# Patient Record
Sex: MALE | Race: WHITE | NOT HISPANIC OR LATINO | Employment: OTHER | URBAN - METROPOLITAN AREA
[De-identification: names, ages, dates, MRNs, and addresses within clinical notes are randomized per-mention and may not be internally consistent; named-entity substitution may affect disease eponyms.]

---

## 2024-04-02 ENCOUNTER — APPOINTMENT (OUTPATIENT)
Dept: GASTROENTEROLOGY | Facility: HOSPITAL | Age: 79
End: 2024-04-02
Payer: MEDICARE

## 2024-04-02 ENCOUNTER — HOSPITAL ENCOUNTER (OUTPATIENT)
Facility: HOSPITAL | Age: 79
Setting detail: OBSERVATION
Discharge: HOME | End: 2024-04-03
Attending: STUDENT IN AN ORGANIZED HEALTH CARE EDUCATION/TRAINING PROGRAM | Admitting: INTERNAL MEDICINE
Payer: MEDICARE

## 2024-04-02 ENCOUNTER — ANESTHESIA (OUTPATIENT)
Dept: GASTROENTEROLOGY | Facility: HOSPITAL | Age: 79
End: 2024-04-02
Payer: MEDICARE

## 2024-04-02 ENCOUNTER — ANESTHESIA EVENT (OUTPATIENT)
Dept: GASTROENTEROLOGY | Facility: HOSPITAL | Age: 79
End: 2024-04-02
Payer: MEDICARE

## 2024-04-02 ENCOUNTER — APPOINTMENT (OUTPATIENT)
Dept: CARDIOLOGY | Facility: HOSPITAL | Age: 79
End: 2024-04-02
Payer: MEDICARE

## 2024-04-02 ENCOUNTER — APPOINTMENT (OUTPATIENT)
Dept: RADIOLOGY | Facility: HOSPITAL | Age: 79
End: 2024-04-02
Payer: MEDICARE

## 2024-04-02 DIAGNOSIS — T18.128A ESOPHAGEAL OBSTRUCTION DUE TO FOOD IMPACTION: Primary | ICD-10-CM
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
## 2024-04-02 DIAGNOSIS — R13.19 ESOPHAGEAL DYSPHAGIA: Primary | ICD-10-CM

## 2024-04-02 PROBLEM — I44.7 LEFT BUNDLE BRANCH BLOCK (LBBB): Status: ACTIVE | Noted: 2023-02-16

## 2024-04-02 PROBLEM — I10 PRIMARY HYPERTENSION: Status: ACTIVE | Noted: 2023-01-20

## 2024-04-02 PROBLEM — I35.8 AORTIC VALVE SCLEROSIS: Status: ACTIVE | Noted: 2023-02-16

## 2024-04-02 PROBLEM — I63.533: Status: ACTIVE | Noted: 2023-01-20

## 2024-04-02 PROBLEM — I25.810 CORONARY ARTERY DISEASE INVOLVING CORONARY BYPASS GRAFT OF NATIVE HEART WITHOUT ANGINA PECTORIS: Status: ACTIVE | Noted: 2023-01-20

## 2024-04-02 PROBLEM — R41.841 COGNITIVE COMMUNICATION DEFICIT: Status: ACTIVE | Noted: 2023-01-30

## 2024-04-02 PROBLEM — R13.10 DYSPHAGIA: Status: ACTIVE | Noted: 2024-04-02

## 2024-04-02 PROBLEM — I25.10 CAD (CORONARY ARTERY DISEASE), NATIVE CORONARY ARTERY: Status: ACTIVE | Noted: 2023-02-16

## 2024-04-02 LAB
ALBUMIN SERPL BCP-MCNC: 3.9 G/DL (ref 3.4–5)
ALP SERPL-CCNC: 63 U/L (ref 33–136)
ALT SERPL W P-5'-P-CCNC: 20 U/L (ref 10–52)
ANION GAP SERPL CALC-SCNC: 11 MMOL/L (ref 10–20)
AST SERPL W P-5'-P-CCNC: 21 U/L (ref 9–39)
BASOPHILS # BLD AUTO: 0.06 X10*3/UL (ref 0–0.1)
BASOPHILS NFR BLD AUTO: 0.8 %
BILIRUB SERPL-MCNC: 0.3 MG/DL (ref 0–1.2)
BUN SERPL-MCNC: 16 MG/DL (ref 6–23)
CALCIUM SERPL-MCNC: 8.9 MG/DL (ref 8.6–10.3)
CARDIAC TROPONIN I PNL SERPL HS: 8 NG/L (ref 0–20)
CHLORIDE SERPL-SCNC: 102 MMOL/L (ref 98–107)
CO2 SERPL-SCNC: 28 MMOL/L (ref 21–32)
CREAT SERPL-MCNC: 0.84 MG/DL (ref 0.5–1.3)
EGFRCR SERPLBLD CKD-EPI 2021: 88 ML/MIN/1.73M*2
EOSINOPHIL # BLD AUTO: 0.49 X10*3/UL (ref 0–0.4)
EOSINOPHIL NFR BLD AUTO: 6.7 %
ERYTHROCYTE [DISTWIDTH] IN BLOOD BY AUTOMATED COUNT: 13.2 % (ref 11.5–14.5)
GLUCOSE SERPL-MCNC: 91 MG/DL (ref 74–99)
HCT VFR BLD AUTO: 40.3 % (ref 41–52)
HGB BLD-MCNC: 13.9 G/DL (ref 13.5–17.5)
IMM GRANULOCYTES # BLD AUTO: 0.03 X10*3/UL (ref 0–0.5)
IMM GRANULOCYTES NFR BLD AUTO: 0.4 % (ref 0–0.9)
LYMPHOCYTES # BLD AUTO: 2.23 X10*3/UL (ref 0.8–3)
LYMPHOCYTES NFR BLD AUTO: 30.5 %
MCH RBC QN AUTO: 33.2 PG (ref 26–34)
MCHC RBC AUTO-ENTMCNC: 34.5 G/DL (ref 32–36)
MCV RBC AUTO: 96 FL (ref 80–100)
MONOCYTES # BLD AUTO: 0.67 X10*3/UL (ref 0.05–0.8)
MONOCYTES NFR BLD AUTO: 9.2 %
NEUTROPHILS # BLD AUTO: 3.84 X10*3/UL (ref 1.6–5.5)
NEUTROPHILS NFR BLD AUTO: 52.4 %
NRBC BLD-RTO: 0 /100 WBCS (ref 0–0)
PLATELET # BLD AUTO: 241 X10*3/UL (ref 150–450)
POTASSIUM SERPL-SCNC: 3.8 MMOL/L (ref 3.5–5.3)
PROT SERPL-MCNC: 6.4 G/DL (ref 6.4–8.2)
RBC # BLD AUTO: 4.19 X10*6/UL (ref 4.5–5.9)
SODIUM SERPL-SCNC: 137 MMOL/L (ref 136–145)
WBC # BLD AUTO: 7.3 X10*3/UL (ref 4.4–11.3)

## 2024-04-02 PROCEDURE — 43239 EGD BIOPSY SINGLE/MULTIPLE: CPT | Performed by: STUDENT IN AN ORGANIZED HEALTH CARE EDUCATION/TRAINING PROGRAM

## 2024-04-02 PROCEDURE — 2720000007 HC OR 272 NO HCPCS

## 2024-04-02 PROCEDURE — 99100 ANES PT EXTEME AGE<1 YR&>70: CPT | Performed by: ANESTHESIOLOGY

## 2024-04-02 PROCEDURE — 99140 ANES COMP EMERGENCY COND: CPT | Performed by: ANESTHESIOLOGY

## 2024-04-02 PROCEDURE — G0378 HOSPITAL OBSERVATION PER HR: HCPCS

## 2024-04-02 PROCEDURE — 2500000004 HC RX 250 GENERAL PHARMACY W/ HCPCS (ALT 636 FOR OP/ED): Mod: JZ | Performed by: PHYSICIAN ASSISTANT

## 2024-04-02 PROCEDURE — 80053 COMPREHEN METABOLIC PANEL: CPT | Performed by: PHYSICIAN ASSISTANT

## 2024-04-02 PROCEDURE — A43239 PR EDG TRANSORAL BIOPSY SINGLE/MULTIPLE: Performed by: NURSE ANESTHETIST, CERTIFIED REGISTERED

## 2024-04-02 PROCEDURE — 71046 X-RAY EXAM CHEST 2 VIEWS: CPT

## 2024-04-02 PROCEDURE — 88305 TISSUE EXAM BY PATHOLOGIST: CPT | Performed by: PATHOLOGY

## 2024-04-02 PROCEDURE — A43239 PR EDG TRANSORAL BIOPSY SINGLE/MULTIPLE: Performed by: ANESTHESIOLOGY

## 2024-04-02 PROCEDURE — 84484 ASSAY OF TROPONIN QUANT: CPT | Performed by: PHYSICIAN ASSISTANT

## 2024-04-02 PROCEDURE — 36415 COLL VENOUS BLD VENIPUNCTURE: CPT | Performed by: PHYSICIAN ASSISTANT

## 2024-04-02 PROCEDURE — 2500000004 HC RX 250 GENERAL PHARMACY W/ HCPCS (ALT 636 FOR OP/ED): Performed by: NURSE ANESTHETIST, CERTIFIED REGISTERED

## 2024-04-02 PROCEDURE — 3700000001 HC GENERAL ANESTHESIA TIME - INITIAL BASE CHARGE

## 2024-04-02 PROCEDURE — 3700000002 HC GENERAL ANESTHESIA TIME - EACH INCREMENTAL 1 MINUTE

## 2024-04-02 PROCEDURE — 93005 ELECTROCARDIOGRAM TRACING: CPT

## 2024-04-02 PROCEDURE — 7100000002 HC RECOVERY ROOM TIME - EACH INCREMENTAL 1 MINUTE

## 2024-04-02 PROCEDURE — 99223 1ST HOSP IP/OBS HIGH 75: CPT | Performed by: STUDENT IN AN ORGANIZED HEALTH CARE EDUCATION/TRAINING PROGRAM

## 2024-04-02 PROCEDURE — 99285 EMERGENCY DEPT VISIT HI MDM: CPT | Mod: 25

## 2024-04-02 PROCEDURE — 2500000005 HC RX 250 GENERAL PHARMACY W/O HCPCS: Performed by: NURSE ANESTHETIST, CERTIFIED REGISTERED

## 2024-04-02 PROCEDURE — 43239 EGD BIOPSY SINGLE/MULTIPLE: CPT

## 2024-04-02 PROCEDURE — 85025 COMPLETE CBC W/AUTO DIFF WBC: CPT | Performed by: PHYSICIAN ASSISTANT

## 2024-04-02 PROCEDURE — 99222 1ST HOSP IP/OBS MODERATE 55: CPT | Performed by: INTERNAL MEDICINE

## 2024-04-02 PROCEDURE — 71046 X-RAY EXAM CHEST 2 VIEWS: CPT | Performed by: RADIOLOGY

## 2024-04-02 PROCEDURE — 7100000001 HC RECOVERY ROOM TIME - INITIAL BASE CHARGE

## 2024-04-02 PROCEDURE — 0753T DGTZ GLS MCRSCP SLD LEVEL IV: CPT | Mod: TC | Performed by: STUDENT IN AN ORGANIZED HEALTH CARE EDUCATION/TRAINING PROGRAM

## 2024-04-02 RX ORDER — PROPOFOL 10 MG/ML
INJECTION, EMULSION INTRAVENOUS AS NEEDED
Status: DISCONTINUED | OUTPATIENT
Start: 2024-04-02 | End: 2024-04-02

## 2024-04-02 RX ORDER — ROCURONIUM BROMIDE 10 MG/ML
INJECTION, SOLUTION INTRAVENOUS AS NEEDED
Status: DISCONTINUED | OUTPATIENT
Start: 2024-04-02 | End: 2024-04-02

## 2024-04-02 RX ORDER — PANTOPRAZOLE SODIUM 40 MG/10ML
40 INJECTION, POWDER, LYOPHILIZED, FOR SOLUTION INTRAVENOUS DAILY
Status: DISCONTINUED | OUTPATIENT
Start: 2024-04-02 | End: 2024-04-03 | Stop reason: HOSPADM

## 2024-04-02 RX ORDER — SODIUM CHLORIDE, SODIUM LACTATE, POTASSIUM CHLORIDE, CALCIUM CHLORIDE 600; 310; 30; 20 MG/100ML; MG/100ML; MG/100ML; MG/100ML
125 INJECTION, SOLUTION INTRAVENOUS CONTINUOUS
Status: DISCONTINUED | OUTPATIENT
Start: 2024-04-02 | End: 2024-04-03

## 2024-04-02 RX ORDER — IPRATROPIUM BROMIDE 0.5 MG/2.5ML
500 SOLUTION RESPIRATORY (INHALATION) ONCE
Status: DISCONTINUED | OUTPATIENT
Start: 2024-04-02 | End: 2024-04-03 | Stop reason: HOSPADM

## 2024-04-02 RX ORDER — OMEPRAZOLE 20 MG/1
20 TABLET, DELAYED RELEASE ORAL
Qty: 90 TABLET | Refills: 3 | Status: SHIPPED | OUTPATIENT
Start: 2024-04-02

## 2024-04-02 RX ORDER — ALBUTEROL SULFATE 0.83 MG/ML
2.5 SOLUTION RESPIRATORY (INHALATION) ONCE AS NEEDED
Status: DISCONTINUED | OUTPATIENT
Start: 2024-04-02 | End: 2024-04-03 | Stop reason: HOSPADM

## 2024-04-02 RX ORDER — ONDANSETRON HYDROCHLORIDE 2 MG/ML
4 INJECTION, SOLUTION INTRAVENOUS ONCE AS NEEDED
Status: DISCONTINUED | OUTPATIENT
Start: 2024-04-02 | End: 2024-04-03 | Stop reason: HOSPADM

## 2024-04-02 RX ORDER — LIDOCAINE HYDROCHLORIDE 10 MG/ML
0.1 INJECTION INFILTRATION; PERINEURAL ONCE
Status: DISCONTINUED | OUTPATIENT
Start: 2024-04-02 | End: 2024-04-03 | Stop reason: HOSPADM

## 2024-04-02 RX ORDER — SUCCINYLCHOLINE CHLORIDE 20 MG/ML
INJECTION INTRAMUSCULAR; INTRAVENOUS AS NEEDED
Status: DISCONTINUED | OUTPATIENT
Start: 2024-04-02 | End: 2024-04-02

## 2024-04-02 RX ORDER — SODIUM CHLORIDE, SODIUM LACTATE, POTASSIUM CHLORIDE, CALCIUM CHLORIDE 600; 310; 30; 20 MG/100ML; MG/100ML; MG/100ML; MG/100ML
100 INJECTION, SOLUTION INTRAVENOUS CONTINUOUS
Status: DISCONTINUED | OUTPATIENT
Start: 2024-04-02 | End: 2024-04-03

## 2024-04-02 RX ORDER — HYDRALAZINE HYDROCHLORIDE 20 MG/ML
10 INJECTION INTRAMUSCULAR; INTRAVENOUS EVERY 6 HOURS PRN
Status: DISCONTINUED | OUTPATIENT
Start: 2024-04-02 | End: 2024-04-03 | Stop reason: HOSPADM

## 2024-04-02 RX ORDER — ACETAMINOPHEN 325 MG/1
650 TABLET ORAL EVERY 4 HOURS PRN
Status: DISCONTINUED | OUTPATIENT
Start: 2024-04-02 | End: 2024-04-03 | Stop reason: HOSPADM

## 2024-04-02 RX ADMIN — ROCURONIUM BROMIDE 10 MG: 10 INJECTION, SOLUTION INTRAVENOUS at 22:11

## 2024-04-02 RX ADMIN — PROPOFOL 120 MG: 10 INJECTION, EMULSION INTRAVENOUS at 22:11

## 2024-04-02 RX ADMIN — SODIUM CHLORIDE, POTASSIUM CHLORIDE, SODIUM LACTATE AND CALCIUM CHLORIDE: 600; 310; 30; 20 INJECTION, SOLUTION INTRAVENOUS at 22:07

## 2024-04-02 RX ADMIN — Medication 1 MG: at 20:58

## 2024-04-02 RX ADMIN — DEXAMETHASONE SODIUM PHOSPHATE 8 MG: 4 INJECTION, SOLUTION INTRAMUSCULAR; INTRAVENOUS at 22:30

## 2024-04-02 RX ADMIN — SUCCINYLCHOLINE CHLORIDE 100 MG: 20 INJECTION, SOLUTION INTRAMUSCULAR; INTRAVENOUS at 22:11

## 2024-04-02 SDOH — HEALTH STABILITY: MENTAL HEALTH: CURRENT SMOKER: 0

## 2024-04-02 ASSESSMENT — ENCOUNTER SYMPTOMS
SHORTNESS OF BREATH: 0
DIZZINESS: 0
VOMITING: 0
WOUND: 0
RHINORRHEA: 0
DIARRHEA: 0
HEMATURIA: 0
PALPITATIONS: 0
MYALGIAS: 0
CONFUSION: 0
FEVER: 0
SORE THROAT: 0
COUGH: 0
HALLUCINATIONS: 0
CONSTIPATION: 0
CHILLS: 0
DYSURIA: 0
NAUSEA: 0
ARTHRALGIAS: 0

## 2024-04-02 ASSESSMENT — PAIN - FUNCTIONAL ASSESSMENT
PAIN_FUNCTIONAL_ASSESSMENT: 0-10

## 2024-04-02 ASSESSMENT — PAIN SCALES - GENERAL
PAINLEVEL_OUTOF10: 0 - NO PAIN

## 2024-04-02 ASSESSMENT — COLUMBIA-SUICIDE SEVERITY RATING SCALE - C-SSRS
6. HAVE YOU EVER DONE ANYTHING, STARTED TO DO ANYTHING, OR PREPARED TO DO ANYTHING TO END YOUR LIFE?: NO
2. HAVE YOU ACTUALLY HAD ANY THOUGHTS OF KILLING YOURSELF?: NO
1. IN THE PAST MONTH, HAVE YOU WISHED YOU WERE DEAD OR WISHED YOU COULD GO TO SLEEP AND NOT WAKE UP?: NO

## 2024-04-02 ASSESSMENT — PAIN DESCRIPTION - DESCRIPTORS: DESCRIPTORS: PRESSURE

## 2024-04-02 NOTE — ED PROVIDER NOTES
"Limitations to History: None  External Records Reviewed  Independent Historians: None  Social determinants affecting care: None    HPI  Henry Alexandre is a 79 y.o. male presents emergency department with his wife for assessment of foreign body sensation in the esophagus.  He reports that he was eating spaghetti when he developed a discomfort in the midsternum of his chest.  He reports that he started spitting and felt very nauseated.  He reports that he did try to drink some pop but this just caused him to spit.  He reports that it feels like a pressure in the middle of his chest.  He has had food bolus obstruction in the past when she had to have EGD.  He has not had fever or chills.  He denies cough or congestion.  He denies abdominal pains.  The patient and his wife have no further complaints.    [facility]  Past Medical History:   Diagnosis Date    Personal history of other endocrine, nutritional and metabolic disease 01/03/2023    History of hypothyroidism    reviewed by myself.    Meds  No current outpatient medications    Allergies  No Known Allergies reviewed by myself.    SHx    reviewed by myself.      ------------------------------------------------------------------------------------------------------------------------------------------    /76   Pulse 55   Temp 36.5 °C (97.7 °F)   Resp (!) 22   Ht 1.778 m (5' 10\")   Wt 82.6 kg (182 lb)   SpO2 94%   BMI 26.11 kg/m²     Physical Exam  Vitals and nursing note reviewed.   Constitutional:       General: He is not in acute distress.     Appearance: Normal appearance. He is normal weight. He is not ill-appearing or toxic-appearing.   HENT:      Head: Normocephalic.      Nose: Nose normal.      Mouth/Throat:      Mouth: Mucous membranes are moist.   Eyes:      Extraocular Movements: Extraocular movements intact.      Conjunctiva/sclera: Conjunctivae normal.   Cardiovascular:      Rate and Rhythm: Regular rhythm. Bradycardia present.   Pulmonary:      Effort: " Pulmonary effort is normal.      Breath sounds: Normal breath sounds.   Abdominal:      General: Abdomen is flat. Bowel sounds are normal.      Palpations: Abdomen is soft.      Tenderness: There is no abdominal tenderness.   Musculoskeletal:         General: Normal range of motion.      Cervical back: Neck supple.   Skin:     General: Skin is warm and dry.   Neurological:      Mental Status: He is alert and oriented to person, place, and time.   Psychiatric:         Attention and Perception: Attention normal.         Mood and Affect: Mood normal.          ------------------------------------------------------------------------------------------------------------------------------------------  Labs  Labs Reviewed   CBC WITH AUTO DIFFERENTIAL - Abnormal       Result Value    WBC 7.3      nRBC 0.0      RBC 4.19 (*)     Hemoglobin 13.9      Hematocrit 40.3 (*)     MCV 96      MCH 33.2      MCHC 34.5      RDW 13.2      Platelets 241      Neutrophils % 52.4      Immature Granulocytes %, Automated 0.4      Lymphocytes % 30.5      Monocytes % 9.2      Eosinophils % 6.7      Basophils % 0.8      Neutrophils Absolute 3.84      Immature Granulocytes Absolute, Automated 0.03      Lymphocytes Absolute 2.23      Monocytes Absolute 0.67      Eosinophils Absolute 0.49 (*)     Basophils Absolute 0.06     COMPREHENSIVE METABOLIC PANEL - Normal    Glucose 91      Sodium 137      Potassium 3.8      Chloride 102      Bicarbonate 28      Anion Gap 11      Urea Nitrogen 16      Creatinine 0.84      eGFR 88      Calcium 8.9      Albumin 3.9      Alkaline Phosphatase 63      Total Protein 6.4      AST 21      Bilirubin, Total 0.3      ALT 20     TROPONIN I, HIGH SENSITIVITY - Normal    Troponin I, High Sensitivity 8      Narrative:     Less than 99th percentile of normal range cutoff-  Female and children under 18 years old <14 ng/L; Male <21 ng/L: Negative  Repeat testing should be performed if clinically indicated.     Female and  children under 18 years old 14-50 ng/L; Male 21-50 ng/L:  Consistent with possible cardiac damage and possible increased clinical   risk. Serial measurements may help to assess extent of myocardial damage.     >50 ng/L: Consistent with cardiac damage, increased clinical risk and  myocardial infarction. Serial measurements may help assess extent of   myocardial damage.      NOTE: Children less than 1 year old may have higher baseline troponin   levels and results should be interpreted in conjunction with the overall   clinical context.     NOTE: Troponin I testing is performed using a different   testing methodology at [facility] than at other   [facility] hospitals. Direct result comparisons should only   be made within the same method.        Imaging  XR chest 2 views   Final Result   1. Slight interval worsening of left basilar atelectasis/scarring or   infiltrate. Clinical correlation and follow-up recommended to   document resolution. No pneumothorax. If there is clinical concern   for acute aortic pathology or pulmonary embolic disease, CT scan of   the chest should be considered for better assessment.             Signed by: Marie Brown 4/2/2024 7:40 PM   Dictation workstation:   [id number]           ED Course  Diagnoses as of 04/02/24 2117   Esophageal obstruction due to food impaction        Medical Decision Making: He did not appear ill or toxic.  Vital signs reviewed and stable.  He could have a partial food bolus obstruction however because he is describing midsternal chest pressure with his age, I did order cardiac workup.  Nursing staff instructed to give him oral fluids.    Differential diagnoses considered: Food bolus obstruction, ACS, GERD, acid reflux, others    Medications given: IV glucagon    EKG interpreted by myself and ED attending: Sinus bradycardia.  Ventricular rate 45 bpm.  Inverted T waves in V1 through V3.  No acute ST elevations or depressions.  Similar appearance from  previous EKG on 5/26/2023.    I reviewed the labs from today.  No leukocytosis or leukopenia.  H&H stable.  No electrolyte abnormalities.  Troponin negative.  Chest x-ray showing slight interval worsening of the left basilar atelectasis/scarring versus infiltrate.  Patient was given oral fluids.  He is reassessed.  He is spitting multiple times after this.  He has not had any vomiting but he still has a foreign body sensation.  Patient ordered IV glucagon.  This did not improve his symptoms.  He still is very uncomfortable.  I consulted gastroenterology due to the concern for food bolus obstruction.  I spoke with Dr. Son will come in for EGD.  This was discussed with the patient who is agreeable.  Case discussed and evaluated with ED attending, Dr. Kraft who is agreeable to patient plan of care.    Diagnosis: Food bolus obstruction  Plan: EGD     Rafal Fox PA-C  04/02/24 1164

## 2024-04-03 VITALS
DIASTOLIC BLOOD PRESSURE: 77 MMHG | OXYGEN SATURATION: 93 % | HEART RATE: 57 BPM | TEMPERATURE: 98.2 F | BODY MASS INDEX: 26.05 KG/M2 | WEIGHT: 182 LBS | RESPIRATION RATE: 16 BRPM | SYSTOLIC BLOOD PRESSURE: 166 MMHG | HEIGHT: 70 IN

## 2024-04-03 PROCEDURE — 99239 HOSP IP/OBS DSCHRG MGMT >30: CPT | Performed by: STUDENT IN AN ORGANIZED HEALTH CARE EDUCATION/TRAINING PROGRAM

## 2024-04-03 PROCEDURE — 97161 PT EVAL LOW COMPLEX 20 MIN: CPT | Mod: GP

## 2024-04-03 PROCEDURE — 97165 OT EVAL LOW COMPLEX 30 MIN: CPT | Mod: GO

## 2024-04-03 PROCEDURE — G0378 HOSPITAL OBSERVATION PER HR: HCPCS

## 2024-04-03 PROCEDURE — 2500000004 HC RX 250 GENERAL PHARMACY W/ HCPCS (ALT 636 FOR OP/ED): Performed by: INTERNAL MEDICINE

## 2024-04-03 PROCEDURE — 96372 THER/PROPH/DIAG INJ SC/IM: CPT | Performed by: INTERNAL MEDICINE

## 2024-04-03 RX ORDER — IBUPROFEN 100 MG/5ML
1000 SUSPENSION, ORAL (FINAL DOSE FORM) ORAL DAILY
COMMUNITY

## 2024-04-03 RX ORDER — RAMIPRIL 10 MG/1
10 CAPSULE ORAL DAILY
COMMUNITY

## 2024-04-03 RX ORDER — TALC
3 POWDER (GRAM) TOPICAL NIGHTLY PRN
Status: DISCONTINUED | OUTPATIENT
Start: 2024-04-03 | End: 2024-04-03 | Stop reason: HOSPADM

## 2024-04-03 RX ORDER — LEVOTHYROXINE SODIUM 75 UG/1
75 TABLET ORAL
COMMUNITY

## 2024-04-03 RX ORDER — HEPARIN SODIUM 5000 [USP'U]/ML
5000 INJECTION, SOLUTION INTRAVENOUS; SUBCUTANEOUS EVERY 8 HOURS
Status: DISCONTINUED | OUTPATIENT
Start: 2024-04-03 | End: 2024-04-03 | Stop reason: HOSPADM

## 2024-04-03 RX ORDER — TRIAMTERENE AND HYDROCHLOROTHIAZIDE 37.5; 25 MG/1; MG/1
1 CAPSULE ORAL EVERY MORNING
COMMUNITY

## 2024-04-03 RX ORDER — AMLODIPINE BESYLATE 10 MG/1
10 TABLET ORAL NIGHTLY
COMMUNITY

## 2024-04-03 RX ORDER — METOPROLOL TARTRATE 25 MG/1
25 TABLET, FILM COATED ORAL DAILY
COMMUNITY

## 2024-04-03 RX ORDER — ACETAMINOPHEN 160 MG/5ML
650 SOLUTION ORAL EVERY 4 HOURS PRN
Status: DISCONTINUED | OUTPATIENT
Start: 2024-04-03 | End: 2024-04-03 | Stop reason: HOSPADM

## 2024-04-03 RX ORDER — ATORVASTATIN CALCIUM 80 MG/1
80 TABLET, FILM COATED ORAL NIGHTLY
COMMUNITY

## 2024-04-03 RX ORDER — ACETAMINOPHEN 650 MG/1
650 SUPPOSITORY RECTAL EVERY 4 HOURS PRN
Status: DISCONTINUED | OUTPATIENT
Start: 2024-04-03 | End: 2024-04-03 | Stop reason: HOSPADM

## 2024-04-03 RX ORDER — TAMSULOSIN HYDROCHLORIDE 0.4 MG/1
0.4 CAPSULE ORAL NIGHTLY
COMMUNITY

## 2024-04-03 RX ORDER — POLYETHYLENE GLYCOL 3350 17 G/17G
17 POWDER, FOR SOLUTION ORAL DAILY PRN
Status: DISCONTINUED | OUTPATIENT
Start: 2024-04-03 | End: 2024-04-03 | Stop reason: HOSPADM

## 2024-04-03 RX ORDER — ACETAMINOPHEN 325 MG/1
650 TABLET ORAL EVERY 4 HOURS PRN
Status: DISCONTINUED | OUTPATIENT
Start: 2024-04-03 | End: 2024-04-03 | Stop reason: HOSPADM

## 2024-04-03 RX ORDER — CETIRIZINE HYDROCHLORIDE 10 MG/1
10 TABLET, CHEWABLE ORAL DAILY
COMMUNITY

## 2024-04-03 RX ORDER — ASPIRIN 81 MG/1
81 TABLET ORAL DAILY
COMMUNITY

## 2024-04-03 RX ADMIN — SODIUM CHLORIDE, POTASSIUM CHLORIDE, SODIUM LACTATE AND CALCIUM CHLORIDE 125 ML/HR: 600; 310; 30; 20 INJECTION, SOLUTION INTRAVENOUS at 03:23

## 2024-04-03 RX ADMIN — HEPARIN SODIUM 5000 UNITS: 5000 INJECTION INTRAVENOUS; SUBCUTANEOUS at 08:17

## 2024-04-03 SDOH — SOCIAL STABILITY: SOCIAL INSECURITY: ARE YOU OR HAVE YOU BEEN THREATENED OR ABUSED PHYSICALLY, EMOTIONALLY, OR SEXUALLY BY ANYONE?: NO

## 2024-04-03 SDOH — SOCIAL STABILITY: SOCIAL INSECURITY: HAVE YOU HAD THOUGHTS OF HARMING ANYONE ELSE?: NO

## 2024-04-03 SDOH — SOCIAL STABILITY: SOCIAL INSECURITY: ARE THERE ANY APPARENT SIGNS OF INJURIES/BEHAVIORS THAT COULD BE RELATED TO ABUSE/NEGLECT?: NO

## 2024-04-03 SDOH — SOCIAL STABILITY: SOCIAL INSECURITY: ABUSE: ADULT

## 2024-04-03 SDOH — SOCIAL STABILITY: SOCIAL INSECURITY: DO YOU FEEL ANYONE HAS EXPLOITED OR TAKEN ADVANTAGE OF YOU FINANCIALLY OR OF YOUR PERSONAL PROPERTY?: NO

## 2024-04-03 SDOH — SOCIAL STABILITY: SOCIAL INSECURITY: DO YOU FEEL UNSAFE GOING BACK TO THE PLACE WHERE YOU ARE LIVING?: NO

## 2024-04-03 SDOH — SOCIAL STABILITY: SOCIAL INSECURITY: DOES ANYONE TRY TO KEEP YOU FROM HAVING/CONTACTING OTHER FRIENDS OR DOING THINGS OUTSIDE YOUR HOME?: NO

## 2024-04-03 SDOH — SOCIAL STABILITY: SOCIAL INSECURITY: HAS ANYONE EVER THREATENED TO HURT YOUR FAMILY OR YOUR PETS?: NO

## 2024-04-03 ASSESSMENT — COGNITIVE AND FUNCTIONAL STATUS - GENERAL
TURNING FROM BACK TO SIDE WHILE IN FLAT BAD: A LITTLE
WALKING IN HOSPITAL ROOM: A LITTLE
MOBILITY SCORE: 22
WALKING IN HOSPITAL ROOM: A LITTLE
MOVING TO AND FROM BED TO CHAIR: A LITTLE
TOILETING: A LITTLE
MOBILITY SCORE: 19
CLIMB 3 TO 5 STEPS WITH RAILING: A LITTLE
HELP NEEDED FOR BATHING: A LITTLE
MOVING FROM LYING ON BACK TO SITTING ON SIDE OF FLAT BED WITH BEDRAILS: A LITTLE
DAILY ACTIVITIY SCORE: 22
PATIENT BASELINE BEDBOUND: NO
DAILY ACTIVITIY SCORE: 24
CLIMB 3 TO 5 STEPS WITH RAILING: A LITTLE

## 2024-04-03 ASSESSMENT — ACTIVITIES OF DAILY LIVING (ADL)
TOILETING: INDEPENDENT
ADEQUATE_TO_COMPLETE_ADL: YES
WALKS IN HOME: INDEPENDENT
LACK_OF_TRANSPORTATION: NO
HEARING - LEFT EAR: HEARING AID
GROOMING: INDEPENDENT
ADL_ASSISTANCE: INDEPENDENT
HEARING - RIGHT EAR: HEARING AID
FEEDING YOURSELF: INDEPENDENT
DRESSING YOURSELF: INDEPENDENT
JUDGMENT_ADEQUATE_SAFELY_COMPLETE_DAILY_ACTIVITIES: YES
BATHING: INDEPENDENT
PATIENT'S MEMORY ADEQUATE TO SAFELY COMPLETE DAILY ACTIVITIES?: YES
BATHING_ASSISTANCE: STAND BY

## 2024-04-03 ASSESSMENT — PATIENT HEALTH QUESTIONNAIRE - PHQ9
2. FEELING DOWN, DEPRESSED OR HOPELESS: NOT AT ALL
1. LITTLE INTEREST OR PLEASURE IN DOING THINGS: NOT AT ALL
SUM OF ALL RESPONSES TO PHQ9 QUESTIONS 1 & 2: 0

## 2024-04-03 ASSESSMENT — LIFESTYLE VARIABLES
HOW OFTEN DO YOU HAVE 6 OR MORE DRINKS ON ONE OCCASION: NEVER
PRESCIPTION_ABUSE_PAST_12_MONTHS: NO
HOW MANY STANDARD DRINKS CONTAINING ALCOHOL DO YOU HAVE ON A TYPICAL DAY: PATIENT DOES NOT DRINK
SUBSTANCE_ABUSE_PAST_12_MONTHS: NO
HOW OFTEN DO YOU HAVE A DRINK CONTAINING ALCOHOL: NEVER
SKIP TO QUESTIONS 9-10: 1
AUDIT-C TOTAL SCORE: 0
AUDIT-C TOTAL SCORE: 0

## 2024-04-03 ASSESSMENT — PAIN - FUNCTIONAL ASSESSMENT
PAIN_FUNCTIONAL_ASSESSMENT: 0-10
PAIN_FUNCTIONAL_ASSESSMENT: 0-10

## 2024-04-03 ASSESSMENT — PAIN SCALES - GENERAL
PAINLEVEL_OUTOF10: 0 - NO PAIN
PAINLEVEL_OUTOF10: 0 - NO PAIN

## 2024-04-03 NOTE — H&P
History Of Present Illness  Henry Alexandre is a 79 y.o. male  with PMH CAD, stroke and dysphagia presented with concern of having spaghetti impacted in throat with dinner.  Presented to ED and taken directly to EGD. Patient had food bolus in lower 1/3 of esophagus and was able to be pushed into stomach.  Patient recommended repeat EGD in 2 months and daily PPI.  Concern for aspiration during procedure so observation was requested.      Past Medical History  Past Medical History:   Diagnosis Date    Personal history of other endocrine, nutritional and metabolic disease 01/03/2023    History of hypothyroidism       Surgical History  Past Surgical History:   Procedure Laterality Date    CT ANGIO NECK  1/8/2023    CT NECK ANGIO W AND WO IV CONTRAST 1/8/2023 DOCTOR OFFICE [facility]    CT HEAD ANGIO W AND WO IV CONTRAST  1/8/2023    CT HEAD ANGIO W AND WO IV CONTRAST 1/8/2023 DOCTOR OFFICE LEGACY    OTHER SURGICAL HISTORY  01/03/2023    Knee replacement    OTHER SURGICAL HISTORY  01/03/2023    Heart surgery    OTHER SURGICAL HISTORY  01/03/2023    Cataract surgery        Social History  He has no history on file for tobacco use, alcohol use, and drug use.    Family History  No family history on file.     Allergies  Patient has no known allergies.    Review of Systems   Reason unable to perform ROS: limited ROS as patient sleepy.   Constitutional:  Negative for chills and fever.   HENT:  Negative for rhinorrhea and sore throat.    Respiratory:  Negative for cough and shortness of breath.    Cardiovascular:  Negative for chest pain and palpitations.   Gastrointestinal:  Negative for constipation, diarrhea, nausea and vomiting.   Genitourinary:  Negative for dysuria and hematuria.   Musculoskeletal:  Negative for arthralgias and myalgias.   Skin:  Negative for rash and wound.   Neurological:  Negative for dizziness and syncope.   Psychiatric/Behavioral:  Negative for confusion and hallucinations.         Physical Exam  Vitals  "reviewed.   Constitutional:       Appearance: Normal appearance. He is normal weight. He is not ill-appearing.   HENT:      Head: Normocephalic and atraumatic.      Mouth/Throat:      Mouth: Mucous membranes are moist.   Eyes:      Conjunctiva/sclera: Conjunctivae normal.   Cardiovascular:      Rate and Rhythm: Normal rate.      Pulses: Normal pulses.   Pulmonary:      Effort: Pulmonary effort is normal.      Breath sounds: Normal breath sounds. No wheezing.   Abdominal:      General: Abdomen is flat. There is no distension.      Palpations: Abdomen is soft.      Tenderness: There is no guarding.   Musculoskeletal:         General: No swelling. Normal range of motion.   Skin:     General: Skin is warm and dry.   Neurological:      General: No focal deficit present.      Mental Status: Mental status is at baseline.   Psychiatric:         Mood and Affect: Mood normal.         Behavior: Behavior normal.          Last Recorded Vitals  Blood pressure 157/86, pulse 51, temperature 36 °C (96.8 °F), temperature source Temporal, resp. rate 16, height 1.778 m (5' 10\"), weight 82.6 kg (182 lb), SpO2 96 %.    Relevant Results             Assessment/Plan   Active Problems:    CAD (coronary artery disease), native coronary artery    Cerebrovascular accident (CVA) due to bilateral stenosis of posterior cerebral arteries (CMS/HCC)    Cognitive communication deficit    Dysphagia    Primary hypertension    Food impaction  Hx CAD, stroke, dysphagia    Observe overnight per GI recs  NPO tonight  Liquid diet to start in am  No oral meds tonight.  IV hydralazine if needed for BP control tonight  Home meds in am once reconcilled  IV PPI tonight, then oral daily  Follow up GI in 2 months  DVT prophy        Montrell Lizama MD    "

## 2024-04-03 NOTE — PROGRESS NOTES
04/03/24 1041   Discharge Planning   Living Arrangements Spouse/significant other   Support Systems Spouse/significant other;Children   Assistance Needed independent   Type of Residence Private residence   Number of Stairs to Enter Residence 3   Number of Stairs Within Residence 12   Patient expects to be discharged to: home   Does the patient need discharge transport arranged? No     I met with patient and his wife at the bedside, verified insurance and demographics.  Patient does not use mobility aids, denies falls and is independent with ADLs.  Patient does not drives since suffering CVA; his wife does all of the driving.  Patient declined home health care, stated he does not need it.  This TCC encouraged patient to reach out if anything changes and he does want to try home health care.  Care Coordination team following for assistance with discharge planning as needed.  Adali MORENO TCC

## 2024-04-03 NOTE — PROGRESS NOTES
Physical Therapy    Physical Therapy Evaluation    Patient Name: Henry Alexandre  MRN: [id number]  Today's Date: 4/3/2024   Time Calculation  Start Time: 0916  Stop Time: 0926  Time Calculation (min): 10 min    Assessment/Plan   PT Assessment  End of Session Communication: Bedside nurse  End of Session Patient Position: Bed, 2 rail up, Alarm off, caregiver present (all needs in reach, no complaints noted, wife present)  IP OR SWING BED PT PLAN  Inpatient or Swing Bed: Inpatient  PT Plan  PT Plan: PT Eval only  PT Eval Only Reason: At baseline function  PT Frequency: PT eval only  PT Discharge Recommendations: No PT needed after discharge, 24 hr supervision due to cognition  PT - OK to Discharge: Yes (once cleared by medical team)    Subjective     Current Problem:  Patient Active Problem List   Diagnosis    CAD (coronary artery disease), native coronary artery    Coronary artery disease involving coronary bypass graft of native heart without angina pectoris    Cerebrovascular accident (CVA) due to bilateral stenosis of posterior cerebral arteries (CMS/HCC)    Cognitive communication deficit    Dysphagia    Primary hypertension    Aortic valve sclerosis    Left bundle branch block (LBBB)     General Visit Information:  General  Reason for Referral: PT Eval and Treat  Referred By: Montrell Lizama MD  Past Medical History Relevant to Rehab: 79 y.o. male  with PMH CAD, stroke and dysphagia presented with concern of having spaghetti impacted in throat with dinner.  Presented to ED and taken directly to EGD. Patient had food bolus in lower 1/3 of esophagus and was able to be pushed into stomach.  Patient recommended repeat EGD in 2 months and daily PPI.  Concern for aspiration during procedure so observation was requested.  Family/Caregiver Present: Yes  Caregiver Feedback: wife present and supportive, states that she is home with pt daily, only leaves the house for exercises class 3x/week  Prior to Session Communication:  Bedside nurse  Patient Position Received: Bed, 2 rail up, Alarm off, caregiver present (Agreeable to PT)    Home Living:  Home Living  Home Living Comments: Pt lives with his wife in a 2 story house with 2 GENEVA without a HR and full flight to 2nd floor with HR to bedroom and bathroom. 1/2 bathroom first floor with standard toilet and full bathroom 2nd floor with tub/shower and high toilet. Laundry is on 1st floor.    Prior Level of Function:  Prior Function Per Pt/Caregiver Report  Level of [place]: Independent with ADLs and functional transfers (Amb ind without an AD, wife does all IADLs and drives)    Precautions:  Precautions  Precautions Comment: Fall precautions, early onset dementia as per pt's wife    Objective     Pain:  Pain Assessment  Pain Assessment:  (0/10)    Cognition:  Cognition  Overall Cognitive Status: Within Functional Limits    General Assessments:  Sensation  Light Touch: No apparent deficits  Strength  Strength Comments: B LE ROM and strength WFL  Dynamic Standing Balance  Dynamic Standing-Comments: Good    Functional Assessments:  Bed Mobility  Bed Mobility:  (supine <> sitting: independent)  Transfers  Transfer:  (STS from EOB: SUP)  Ambulation/Gait Training  Ambulation/Gait Training Performed:  (Pt was able to amb 100' x 2 without an AD with SUP demonstrating good gait mechanics and balance)    Outcome Measures:  [facility] Basic Mobility  Turning from your back to your side while in a flat bed without using bedrails: None  Moving from lying on your back to sitting on the side of a flat bed without using bedrails: None  Moving to and from bed to chair (including a wheelchair): None  Standing up from a chair using your arms (e.g. wheelchair or bedside chair): None  To walk in hospital room: A little  Climbing 3-5 steps with railing: A little  Basic Mobility - Total Score: 22       Education Documentation  No documentation found.  Education Comments  No comments found.          within normal limits

## 2024-04-03 NOTE — DISCHARGE INSTRUCTIONS
Continue taking Omeprazole 20mg once a day for the next 2 months.  As discussed, you will need to repeat your upper endoscopy with GI.

## 2024-04-03 NOTE — ANESTHESIA PREPROCEDURE EVALUATION
Patient: Henry Alexandre    Procedure Information       Date/Time: 04/02/24 2017    Scheduled providers: Nader Hamm MD    Procedure: EGD    Location: [facility]            Relevant Problems   Cardiac   (+) CAD (coronary artery disease), native coronary artery   (+) Coronary artery disease involving coronary bypass graft of native heart without angina pectoris   (+) Primary hypertension      Neuro   (+) Cerebrovascular accident (CVA) due to bilateral stenosis of posterior cerebral arteries (CMS/HCC)      GI   (+) Dysphagia       Clinical information reviewed:    Allergies   Problems              NPO Detail:  No data recorded     Physical Exam    Airway  Mallampati: I  TM distance: >3 FB  Neck ROM: full     Cardiovascular - normal exam  Rhythm: regular  Rate: normal     Dental - normal exam     Pulmonary - normal exam     Abdominal            Anesthesia Plan    History of general anesthesia?: yes  History of complications of general anesthesia?: no    ASA 3 - emergent     general   (RSI)  The patient is not a current smoker.    intravenous induction   Postoperative administration of opioids is intended.  Trial extubation is planned.  Anesthetic plan and risks discussed with patient.  Use of blood products discussed with patient who consented to blood products.    Plan discussed with CRNA.

## 2024-04-03 NOTE — PROGRESS NOTES
Occupational Therapy    Evaluation    Patient Name: Henry Alexandre  MRN: [id number]  Today's Date: 4/3/2024  Time Calculation  Start Time: 0917  Stop Time: 0926  Time Calculation (min): 9 min    Assessment  IP OT Assessment  Evaluation/Treatment Tolerance: Patient tolerated treatment well  Medical Staff Made Aware: Yes  End of Session Communication: Bedside nurse  End of Session Patient Position: Bed, 2 rail up, Alarm off, caregiver present (wife at bedside, all current needs met)    Plan:  No Skilled OT: No acute OT goals identified  OT Frequency: OT eval only  OT Discharge Recommendations: No further acute OT, 24 hr supervision due to cognition  OT - OK to Discharge: Yes (once medically appropriate)    Subjective     Current Problem:  1. Esophageal obstruction due to food impaction  EGD    omeprazole OTC (PriLOSEC OTC) 20 mg EC tablet    Surgical Pathology Exam    Surgical Pathology Exam    CANCELED: Surgical Pathology Exam    CANCELED: Surgical Pathology Exam          General:  General  Reason for Referral: OT eval and treat  Referred By: Montrell Lizmaa MD  Past Medical History Relevant to Rehab: CAD, stroke and dysphagia  Family/Caregiver Present: Yes  Caregiver Feedback: wife present and supportive, states that she is home with pt daily, only leaves the house for exercises class 3x/week  Prior to Session Communication: Bedside nurse  Patient Position Received: Bed, 2 rail up, Alarm off, not on at start of session  General Comment: 78 y/o M presnts with concern of having spaghetti impacted in throat with dinner.  Presented to ED and taken directly to EGD. Patient had food bolus in lower 1/3 of esophagus and was able to be pushed into stomach.  Patient recommended repeat EGD in 2 months and daily PPI.  Concern for aspiration during procedure so observation was requested.    Precautions:  Medical Precautions: Fall precautions  Precautions Comment: per report from pt and pt's wife, pt has early onset dementia and  demo's intermittent confusion    Pain:  Pain Assessment  Pain Assessment: 0-10  Pain Score: 0 - No pain    Objective     Cognition:  Overall Cognitive Status: Within Functional Limits  Insight: Mild             Home Living:  Type of Home: House  Lives With: Spouse  Home Adaptive Equipment: Walker rolling or standard  Home Layout: Multi-level  Home Access: Stairs to enter without rails  Entrance Stairs-Number of Steps: 2  Bathroom Shower/Tub: Tub/shower unit  Bathroom Toilet: Handicapped height  Bathroom Equipment: None  Home Living Comments: Pt lives with his wife in a multi level home, bed/bath on 2nd floor with full flight w/hand rails; half bath on first floor. Owns a FWW, but does not use.     Prior Function:  Level of [place]: Independent with ADLs and functional transfers  ADL Assistance: Independent  Ambulatory Assistance: Independent  Prior Function Comments: Pt reports indep with ADLs PTA  and was not using AD for functional mobility. Wife completes IADLs. Pt does not drive. No falls.        ADL:  Eating Assistance: Independent  Grooming Assistance: Independent  Bathing Assistance: Stand by  UE Dressing Assistance: Independent  LE Dressing Assistance: Stand by  Toileting Assistance with Device: Stand by    Activity Tolerance:  Endurance: Endurance does not limit participation in activity    Bed Mobility/Transfers:   Bed Mobility  Bed Mobility: Yes  Bed Mobility 1  Bed Mobility 1: Supine to sitting, Sitting to supine  Level of Assistance 1: Independent  Transfers  Transfer: Yes  Transfer 1  Technique 1: Sit to stand, Stand to sit  Trials/Comments 1: STS from bed level with SUP    Ambulation/Gait Training:  Functional Mobility  Functional Mobility Performed: Yes  Functional Mobility 1  Device 1: No device  Comments 1: Pt completes functional mobility in [facility] for simulated community access with SUPERVISION and no AD; pt able to navigate obstacles and change directions with fair+ balance    Sitting  Balance:  Static Sitting Balance  Static Sitting-Comment/Number of Minutes: good  Dynamic Sitting Balance  Dynamic Sitting-Comments: fair+    Standing Balance:  Static Standing Balance  Static Standing-Comment/Number of Minutes: fair+  Dynamic Standing Balance  Dynamic Standing-Comments: fair+    Sensation:  Light Touch: No apparent deficits    Strength:  Strength Comments: bilat UE WFL    Coordination:  Movements are Fluid and Coordinated: Yes         Extremities: RUE   RUE : Within Functional Limits and LUE   LUE: Within Functional Limits    Outcome Measures: [facility] Daily Activity  Putting on and taking off regular lower body clothing: None  Bathing (including washing, rinsing, drying): None  Putting on and taking off regular upper body clothing: None  Toileting, which includes using toilet, bedpan or urinal: None  Taking care of personal grooming such as brushing teeth: None  Eating Meals: None  Daily Activity - Total Score: 24                    EDUCATION:     Education Documentation  Body Mechanics, taught by Mar Astorga OT at 4/3/2024 11:41 AM.  Learner: Significant Other, Patient  Readiness: Acceptance  Method: Explanation  Response: Verbalizes Understanding    Precautions, taught by Mar Astorga OT at 4/3/2024 11:41 AM.  Learner: Significant Other, Patient  Readiness: Acceptance  Method: Explanation  Response: Verbalizes Understanding    ADL Training, taught by Mar Astorga OT at 4/3/2024 11:41 AM.  Learner: Significant Other, Patient  Readiness: Acceptance  Method: Explanation  Response: Verbalizes Understanding    Education Comments  No comments found.

## 2024-04-03 NOTE — CARE PLAN
The patient's goals for the shift include      The clinical goals for the shift include will be free from any injury    Over the shift, the patient did not make progress toward the following goals. Barriers to progression include increasing diet as tolerates.

## 2024-04-03 NOTE — ANESTHESIA POSTPROCEDURE EVALUATION
Patient: Henry Alexandre    Procedure Summary       Date: 04/02/24 Room / Location: [facility]    Anesthesia Start: 2207 Anesthesia Stop:     Procedure: EGD Diagnosis: Esophageal obstruction due to food impaction    Scheduled Providers: Nader Hamm MD Responsible Provider: Nader Hamm MD    Anesthesia Type: general ASA Status: 3 - Emergent            Anesthesia Type: general    Vitals Value Taken Time   /82 04/02/24 2243   Temp 36.0 04/02/24 2243   Pulse 59 04/02/24 2243   Resp 16 04/02/24 2243   SpO2 98% 04/02/24 2243       Anesthesia Post Evaluation    Patient location during evaluation: PACU  Patient participation: complete - patient participated  Level of consciousness: sleepy but conscious  Pain management: adequate  Airway patency: patent  Cardiovascular status: acceptable  Respiratory status: acceptable  Hydration status: acceptable  Postoperative Nausea and Vomiting: none        No notable events documented.

## 2024-04-03 NOTE — ANESTHESIA PROCEDURE NOTES
Airway  Date/Time: 4/2/2024 10:14 PM  Urgency: emergent    Airway not difficult    Staffing  Performed: attending   Authorized by: Nader Hamm MD    Performed by: JAVED Chauhan-AMADOR  Patient location during procedure: OR    Consent for Airway (if performed for an anesthetic, see related documentation for consents)  Patient identity confirmed: verbally with patient  Consent: Verbal consent obtained.  Consent given by: patient      Indications and Patient Condition  Indications for airway management: airway protection and anesthesia  Spontaneous ventilation: present  Sedation level: deep  Preoxygenated: yes  Patient position: sniffing  MILS maintained throughout  Mask difficulty assessment: 0 - not attempted  Planned trial extubation    Final Airway Details  Final airway type: endotracheal airway      Successful airway: ETT  Cuffed: yes   Successful intubation technique: direct laryngoscopy  Facilitating devices/methods: intubating stylet and cricoid pressure  Endotracheal tube insertion site: oral  Blade: Mayela  Blade size: #4  ETT size (mm): 7.5  Cormack-Lehane Classification: grade IIa - partial view of glottis  Placement verified by: capnometry   Measured from: lips  ETT to lips (cm): 22  Number of attempts at approach: 1    Additional Comments  RSI. Patient vomited on induction. Airway suction. Patient intubated by Dr. Hamm. ETT suctioned immediately for small amount of stomach contents. Airway visualized with fiberoptic and airway suctioned. Patient maintaining saturation and exchanging well after extubation. Will plan to admit for observation.

## 2024-04-03 NOTE — CONSULTS
HPI   History of Present Illness:   Henry Alexandre is a 79 y.o. male with a PMH of prior food bolus impaction who is presenting today with recurrent impaction.  He was having dinner with his family for his birthday, and appears to have had an impaction with spaghetti.  He is unable to tolerate his secretions, has not responded to Reglan and glucagon therapy.    Review of Systems  ROS Negative unless otherwise stated above.     History   Past Medical History   has a past medical history of Personal history of other endocrine, nutritional and metabolic disease (01/03/2023).     Social History        Family History  family history is not on file.     Allergies  No Known Allergies    Medications  Current Outpatient Medications   Medication Instructions    omeprazole OTC (PRILOSEC OTC) 20 mg, oral, Daily before breakfast, Do not crush, chew, or split.      [unfilled]  Objective   Visit Vitals  /83 (BP Location: Right arm, Patient Position: Lying)   Pulse 78   Temp 36.2 °C (97.2 °F) (Temporal)   Resp 16        General: A&Ox3, NAD.  HEENT: AT/NC.   CV: RRR. No murmur.  Resp: CTA bilaterally. No wheezing, rhonchi or rales.   GI: Soft, NT/ND. BSx4.  Extrem: No edema. Pulses intact.  Skin: No Jaundice.   Neuro: No focal deficits.   Psych: Normal mood and affect.       Results from last 7 days   Lab Units 04/02/24  1938   WBC AUTO x10*3/uL 7.3   HEMOGLOBIN g/dL 13.9   HEMATOCRIT % 40.3*   PLATELETS AUTO x10*3/uL 241     Results from last 7 days   Lab Units 04/02/24  1938   SODIUM mmol/L 137   POTASSIUM mmol/L 3.8   CHLORIDE mmol/L 102   CO2 mmol/L 28   BUN mg/dL 16   CREATININE mg/dL 0.84   CALCIUM mg/dL 8.9   PROTEIN TOTAL g/dL 6.4   BILIRUBIN TOTAL mg/dL 0.3   ALK PHOS U/L 63   ALT U/L 20   AST U/L 21   GLUCOSE mg/dL 91        Assessment     This is a 79 y.o. male with a PMH significant for prior food impactions is presenting with a recurrent impaction.  Will perform an urgent endoscopy at this time for  clearance of his esophagus.          Power Son MD  GI Attending

## 2024-04-03 NOTE — DISCHARGE SUMMARY
"Hospital Medicine Discharge Summary    Patient Name: Henry Alexandre  YOB: 1945    Discharge Diagnosis:   Food bolus impaction s/p endoscopic disimpaction on 4/2     Discharge Date: 4/3/2024  Discharge Location: Home    Hospital Course:  This is an 79-year-old male, with history of hiatal hernia, GERD, hx CVA, vascular dementia, CAD s/p CABG in 2003, HTN, HLD, hypothyroidism, and BPH, who initially presented to [facility] on 4/2/24 with concern for recurrent food impaction after eating a spoonful of spaghetti. He required endoscopic disimpaction during which time he was found to have food bolus in the distal lower third of his esophagus. Biopsies were taken around the GE junction, which was found to be inflamed. Given concern for aspiration, he was admitted for observation. He has since been advanced to clear liquids and has tolerated this without issues. At this time, he is stable for discharge home with instructions to follow up with GI for repeat EGD in 2 months. He is advised to continue taking Omeprazole daily until then.    Physical Exam:     /77   Pulse 57   Temp 36.8 °C (98.2 °F)   Resp 16   Ht 1.778 m (5' 10\")   Wt 82.6 kg (182 lb)   SpO2 93%   BMI 26.11 kg/m²     Physical Exam:   Constitutional: well developed, awake, alert, no acute distress  ENMT: mucous membranes moist, EOMI, conjunctivae clear  Head/Neck: normocephalic, atraumatic; supple, trachea midline  Respiratory/Thorax: patent airways, CTAB; no wheezes, rales, or rhonchi  Cardiovascular: RRR, no murmur appreciated  Gastrointestinal: soft, nondistended, non-tender, bowel sounds appreciated  Extremities: palpable peripheral pulses, no edema  Neurological: AO x3, no focal deficits  Psychological: pleasant, cooperative  Skin: warm and dry    Discharge Medications:     Your medication list        START taking these medications        Instructions Last Dose Given Next Dose Due   omeprazole OTC 20 mg EC tablet  Commonly known as: " PriLOSEC OTC      Take 1 tablet (20 mg) by mouth once daily in the morning. Take before meals. Do not crush, chew, or split.              CONTINUE taking these medications        Instructions Last Dose Given Next Dose Due   amLODIPine 10 mg tablet  Commonly known as: Norvasc           ascorbic acid 1,000 mg tablet  Commonly known as: Vitamin C           aspirin 81 mg EC tablet           atorvastatin 80 mg tablet  Commonly known as: Lipitor           cetirizine 10 mg chewable tablet  Commonly known as: ZyrTEC           Flomax 0.4 mg 24 hr capsule  Generic drug: tamsulosin           levothyroxine 75 mcg tablet  Commonly known as: Synthroid, Levoxyl           metoprolol tartrate 25 mg tablet  Commonly known as: Lopressor           ramipril 10 mg capsule  Commonly known as: Altace           triamterene-hydrochlorothiazid 37.5-25 mg capsule  Commonly known as: Dyazide           XHANCE NASL                     Where to Get Your Medications        You can get these medications from any pharmacy    Bring a paper prescription for each of these medications  omeprazole OTC 20 mg EC tablet          Adalberto Aguila DO  Internal Medicine PGY3

## 2024-04-04 ENCOUNTER — TELEPHONE (OUTPATIENT)
Dept: GASTROENTEROLOGY | Facility: HOSPITAL | Age: 79
End: 2024-04-04
Payer: MEDICARE

## 2024-04-05 LAB
ATRIAL RATE: 45 BPM
P AXIS: 39 DEGREES
P OFFSET: 107 MS
P ONSET: 66 MS
PR INTERVAL: 288 MS
Q ONSET: 210 MS
QRS COUNT: 8 BEATS
QRS DURATION: 108 MS
QT INTERVAL: 456 MS
QTC CALCULATION(BAZETT): 394 MS
QTC FREDERICIA: 414 MS
R AXIS: 47 DEGREES
T AXIS: 75 DEGREES
T OFFSET: 438 MS
VENTRICULAR RATE: 45 BPM

## 2024-04-11 LAB
LABORATORY COMMENT REPORT: NORMAL
PATH REPORT.FINAL DX SPEC: NORMAL
PATH REPORT.GROSS SPEC: NORMAL
PATH REPORT.RELEVANT HX SPEC: NORMAL
PATH REPORT.TOTAL CANCER: NORMAL

## 2024-06-05 ENCOUNTER — ANESTHESIA (OUTPATIENT)
Dept: GASTROENTEROLOGY | Facility: HOSPITAL | Age: 79
End: 2024-06-05
Payer: MEDICARE

## 2024-06-05 ENCOUNTER — HOSPITAL ENCOUNTER (OUTPATIENT)
Dept: GASTROENTEROLOGY | Facility: HOSPITAL | Age: 79
Setting detail: OUTPATIENT SURGERY
Discharge: HOME | End: 2024-06-05
Payer: MEDICARE

## 2024-06-05 ENCOUNTER — ANESTHESIA EVENT (OUTPATIENT)
Dept: GASTROENTEROLOGY | Facility: HOSPITAL | Age: 79
End: 2024-06-05
Payer: MEDICARE

## 2024-06-05 VITALS
DIASTOLIC BLOOD PRESSURE: 80 MMHG | HEIGHT: 70 IN | OXYGEN SATURATION: 94 % | BODY MASS INDEX: 26.07 KG/M2 | TEMPERATURE: 97.2 F | SYSTOLIC BLOOD PRESSURE: 155 MMHG | HEART RATE: 41 BPM | RESPIRATION RATE: 16 BRPM | WEIGHT: 182.1 LBS

## 2024-06-05 DIAGNOSIS — R13.19 ESOPHAGEAL DYSPHAGIA: ICD-10-CM

## 2024-06-05 PROCEDURE — 2500000004 HC RX 250 GENERAL PHARMACY W/ HCPCS (ALT 636 FOR OP/ED): Performed by: STUDENT IN AN ORGANIZED HEALTH CARE EDUCATION/TRAINING PROGRAM

## 2024-06-05 PROCEDURE — 3700000002 HC GENERAL ANESTHESIA TIME - EACH INCREMENTAL 1 MINUTE

## 2024-06-05 PROCEDURE — 7100000010 HC PHASE TWO TIME - EACH INCREMENTAL 1 MINUTE

## 2024-06-05 PROCEDURE — 7100000009 HC PHASE TWO TIME - INITIAL BASE CHARGE

## 2024-06-05 PROCEDURE — 2500000004 HC RX 250 GENERAL PHARMACY W/ HCPCS (ALT 636 FOR OP/ED): Performed by: NURSE ANESTHETIST, CERTIFIED REGISTERED

## 2024-06-05 PROCEDURE — 43235 EGD DIAGNOSTIC BRUSH WASH: CPT | Performed by: STUDENT IN AN ORGANIZED HEALTH CARE EDUCATION/TRAINING PROGRAM

## 2024-06-05 PROCEDURE — 3700000001 HC GENERAL ANESTHESIA TIME - INITIAL BASE CHARGE

## 2024-06-05 RX ORDER — SODIUM CHLORIDE, SODIUM LACTATE, POTASSIUM CHLORIDE, CALCIUM CHLORIDE 600; 310; 30; 20 MG/100ML; MG/100ML; MG/100ML; MG/100ML
20 INJECTION, SOLUTION INTRAVENOUS CONTINUOUS
Status: DISCONTINUED | OUTPATIENT
Start: 2024-06-05 | End: 2024-06-06 | Stop reason: HOSPADM

## 2024-06-05 RX ORDER — PROPOFOL 10 MG/ML
INJECTION, EMULSION INTRAVENOUS AS NEEDED
Status: DISCONTINUED | OUTPATIENT
Start: 2024-06-05 | End: 2024-06-05

## 2024-06-05 RX ORDER — ONDANSETRON HYDROCHLORIDE 2 MG/ML
4 INJECTION, SOLUTION INTRAVENOUS ONCE AS NEEDED
Status: CANCELLED | OUTPATIENT
Start: 2024-06-05

## 2024-06-05 RX ADMIN — PROPOFOL 100 MCG/KG/MIN: 10 INJECTION, EMULSION INTRAVENOUS at 14:28

## 2024-06-05 RX ADMIN — SODIUM CHLORIDE, POTASSIUM CHLORIDE, SODIUM LACTATE AND CALCIUM CHLORIDE 20 ML/HR: 600; 310; 30; 20 INJECTION, SOLUTION INTRAVENOUS at 12:11

## 2024-06-05 RX ADMIN — PROPOFOL 20000 MCG: 10 INJECTION, EMULSION INTRAVENOUS at 14:29

## 2024-06-05 RX ADMIN — PROPOFOL 30000 MCG: 10 INJECTION, EMULSION INTRAVENOUS at 14:27

## 2024-06-05 SDOH — HEALTH STABILITY: MENTAL HEALTH: CURRENT SMOKER: 0

## 2024-06-05 ASSESSMENT — COLUMBIA-SUICIDE SEVERITY RATING SCALE - C-SSRS
2. HAVE YOU ACTUALLY HAD ANY THOUGHTS OF KILLING YOURSELF?: NO
6. HAVE YOU EVER DONE ANYTHING, STARTED TO DO ANYTHING, OR PREPARED TO DO ANYTHING TO END YOUR LIFE?: NO
1. IN THE PAST MONTH, HAVE YOU WISHED YOU WERE DEAD OR WISHED YOU COULD GO TO SLEEP AND NOT WAKE UP?: NO

## 2024-06-05 ASSESSMENT — PAIN SCALES - GENERAL
PAINLEVEL_OUTOF10: 0 - NO PAIN
PAIN_LEVEL: 0
PAINLEVEL_OUTOF10: 0 - NO PAIN

## 2024-06-05 ASSESSMENT — PAIN - FUNCTIONAL ASSESSMENT
PAIN_FUNCTIONAL_ASSESSMENT: 0-10

## 2024-06-05 NOTE — H&P
Procedure H&P    Patient Profile-Procedures  Name Henry Alexandre  Date of Birth 1945  MRN [id number]  Address   [address] [place] [id number] [place] [address]    Primary Phone Number [phone]  Secondary Phone Number    [facility] Derik Murillo    Procedure(s):  Procedures: EGD  Primary contact name and number   Extended Emergency Contact Information  Primary Emergency Contact: Kyra Alexandre  Address: [address] [place] [address] [place] [facility]  Home Phone: [phone]  Relation: Spouse    General Health  Weight There were no vitals filed for this visit.  BMI There is no height or weight on file to calculate BMI.    Allergies  No Known Allergies    Past Medical History   Past Medical History:   Diagnosis Date    Personal history of other endocrine, nutritional and metabolic disease 01/03/2023    History of hypothyroidism       Provider assessment  Diagnosis:  dysphagia  Medication Reviewed - yes  Prior to Admission medications    Medication Sig Start Date End Date Taking? Authorizing Provider   amLODIPine (Norvasc) 10 mg tablet Take 1 tablet (10 mg) by mouth once daily at bedtime.   Yes Historical Provider, MD   ascorbic acid (Vitamin C) 1,000 mg tablet Take 1 tablet (1,000 mg) by mouth once daily.   Yes Historical Provider, MD   aspirin 81 mg EC tablet Take 1 tablet (81 mg) by mouth once daily.   Yes Historical Provider, MD   atorvastatin (Lipitor) 80 mg tablet Take 1 tablet (80 mg) by mouth once daily at bedtime.   Yes Historical Provider, MD   cetirizine (ZyrTEC) 10 mg chewable tablet Chew 1 tablet (10 mg) once daily.   Yes Historical Provider, MD   fluticasone propionate (XHANCE NASL) Administer 2 puffs into affected nostril(s) 2 times a day.   Yes Historical Provider, MD   levothyroxine (Synthroid, Levoxyl) 75 mcg tablet Take 1 tablet (75 mcg) by mouth once daily in the morning. Take before meals.   Yes Historical Provider, MD   metoprolol tartrate (Lopressor) 25 mg  tablet Take 1 tablet (25 mg) by mouth once daily.   Yes Historical Provider, MD   omeprazole OTC (PriLOSEC OTC) 20 mg EC tablet Take 1 tablet (20 mg) by mouth once daily in the morning. Take before meals. Do not crush, chew, or split. 4/2/24  Yes Power Son MD   ramipril (Altace) 10 mg capsule Take 1 capsule (10 mg) by mouth once daily.   Yes Historical Provider, MD   tamsulosin (Flomax) 0.4 mg 24 hr capsule Take 1 capsule (0.4 mg) by mouth once daily at bedtime.   Yes Historical Provider, MD   triamterene-hydrochlorothiazid (Dyazide) 37.5-25 mg capsule Take 1 capsule by mouth once daily in the morning.   Yes Historical Provider, MD       Physical Exam  There were no vitals filed for this visit.     General: A&Ox3, NAD.  HEENT: AT/NC.   CV: RRR. No murmur.  Resp: CTA bilaterally. No wheezing, rhonchi or rales.   GI: Soft, NT/ND. BSx4.  Extrem: No edema. Pulses intact.  Neuro: No focal deficits.   Psych: Normal mood and affect.      Procedure Plan - pre-procedural (re)assesment completed by physician:  discharge/transfer patient when discharge criteria met    Power Son MD  6/5/2024 12:07 PM

## 2024-06-05 NOTE — DISCHARGE INSTRUCTIONS
Patient Instructions after an Endoscopy      Post-procedure recommendations:  - The patient will be observed post-procedure, until all discharge criteria are met.   - Discharge the patient to home with family member/escort.  - Resume previous diet.  - Continue present medications.  - Observe patient's clinical course following today's procedure with therapeutic intervention.   - Watch for bleeding, perforation, and infection.   - Follow-up with referring physician.   - Return to primary care physician.  - The patient has a contact number available for  emergencies.  The signs and symptoms of potential delayed complications were discussed with the patient.  Return to normal activities tomorrow.  Written discharge instructions were provided to the patient.    The anesthetics, sedatives or narcotics which were given to you today will be acting in your body for the next 24 hours, so you might feel a little sleepy or groggy.  This feeling should slowly wear off. Carefully read and follow the instructions.     You received sedation today:  - Do not drive or operate any machinery or power tools of any kind.   - No alcoholic beverages today, not even beer or wine.  - Do not make any important decisions or sign any legal documents.  - No over the counter medications that contain alcohol or that may cause drowsiness.  - Do not make any important decisions or sign any legal documents.    While it is common to experience mild to moderate abdominal distention, gas, or belching after your procedure, if any of these symptoms occur following discharge from the GI Lab or within one week of having your procedure, call the [facility] to be advised whether a visit to your nearest Urgent Care or Emergency Department is indicated.  Take this paper with you if you go:  - If you develop an allergic reaction to the medications that were given during your procedure such as difficulty breathing, rash, hives, severe nausea,  vomiting or lightheadedness.  - If you experience chest pain, shortness of breath, severe abdominal pain, fevers and chills.  - If you develop signs and symptoms of bleeding such as blood in your spit, if your stools turn black, tarry, or bloody.  - If you have not urinated within 8 hours following your procedure.  - If your IV site becomes painful, red, inflamed, or looks infected.       If you experience any problems or have any questions following discharge from the GI Lab, please call: [phone]

## 2024-06-05 NOTE — ANESTHESIA POSTPROCEDURE EVALUATION
Patient: Henry Alexandre    Procedure Summary       Date: 06/05/24 Room / Location: [facility]    Anesthesia Start: 1423 Anesthesia Stop: 1438    Procedure: EGD Diagnosis: Esophageal dysphagia    Scheduled Providers: Power Son MD; Aurelio Au MD Responsible Provider: Aurelio Au MD    Anesthesia Type: MAC ASA Status: 3            Anesthesia Type: MAC    Vitals Value Taken Time   /63 06/05/24 1434   Temp 36.2 °C (97.2 °F) 06/05/24 1434   Pulse 43 06/05/24 1434   Resp 16 06/05/24 1434   SpO2 95 % 06/05/24 1434       Anesthesia Post Evaluation    Patient location during evaluation: PACU  Patient participation: complete - patient participated  Level of consciousness: sleepy but conscious  Pain score: 0  Pain management: adequate  Airway patency: patent  Cardiovascular status: acceptable, blood pressure returned to baseline and hemodynamically stable  Respiratory status: acceptable and nasal cannula  Hydration status: acceptable  Postoperative Nausea and Vomiting: none        There were no known notable events for this encounter.

## 2024-06-05 NOTE — ANESTHESIA PREPROCEDURE EVALUATION
Patient: Henry Alexandre    Procedure Information       Date/Time: 06/05/24 1300    Scheduled providers: Power Son MD; Aurelio Au MD    Procedure: EGD    Location: [facility]            Relevant Problems   Anesthesia (within normal limits)      Cardiac   (+) Aortic valve sclerosis   (+) CAD (coronary artery disease), native coronary artery   (+) Coronary artery disease involving coronary bypass graft of native heart without angina pectoris   (+) Left bundle branch block (LBBB)   (+) Primary hypertension      Neuro   (+) Cerebrovascular accident (CVA) due to bilateral stenosis of posterior cerebral arteries (Multi)      GI   (+) Dysphagia       Clinical information reviewed:   Tobacco  Allergies  Meds   Med Hx  Surg Hx   Fam Hx  Soc Hx        NPO Detail:  NPO/Void Status  Date of Last Liquid: 06/05/24  Time of Last Liquid: 0000  Date of Last Solid: 06/05/24  Time of Last Solid: 0000         Physical Exam    Airway  Mallampati: I  TM distance: >3 FB  Neck ROM: full     Cardiovascular - normal exam  Rhythm: regular  Rate: normal     Dental - normal exam     Pulmonary - normal exam     Abdominal            Anesthesia Plan    History of general anesthesia?: yes  History of complications of general anesthesia?: no    ASA 3     MAC     The patient is not a current smoker.    intravenous induction   Postoperative administration of opioids is intended.  Anesthetic plan and risks discussed with patient.  Use of blood products discussed with patient who consented to blood products.    Plan discussed with CRNA.

## 2024-06-10 DIAGNOSIS — R13.19 ESOPHAGEAL DYSPHAGIA: Primary | ICD-10-CM

## 2024-06-10 PROBLEM — R41.89 COGNITIVE IMPAIRMENT: Status: ACTIVE | Noted: 2023-01-12

## 2024-06-10 PROBLEM — R29.898 SHOULDER WEAKNESS: Status: ACTIVE | Noted: 2024-05-14

## 2024-06-10 PROBLEM — K63.5 COLON POLYP: Status: ACTIVE | Noted: 2024-06-10

## 2024-06-10 PROBLEM — Z96.651 PRESENCE OF RIGHT ARTIFICIAL KNEE JOINT: Status: ACTIVE | Noted: 2017-09-08

## 2024-06-10 PROBLEM — M43.17 SPONDYLOLISTHESIS OF LUMBOSACRAL REGION: Status: ACTIVE | Noted: 2023-06-02

## 2024-06-10 PROBLEM — K44.9 LARGE HIATAL HERNIA: Status: ACTIVE | Noted: 2024-06-10

## 2024-06-10 PROBLEM — F01.A0 MILD VASCULAR DEMENTIA (MULTI): Status: ACTIVE | Noted: 2023-06-01

## 2024-06-10 PROBLEM — E78.5 HYPERLIPIDEMIA: Status: ACTIVE | Noted: 2023-01-20

## 2024-06-10 PROBLEM — K14.3 HYPERTROPHY OF TONGUE PAPILLAE: Status: ACTIVE | Noted: 2024-06-10

## 2024-06-10 PROBLEM — K57.90 DIVERTICULOSIS: Status: ACTIVE | Noted: 2024-06-10

## 2024-06-10 PROBLEM — M25.511 ACUTE PAIN OF RIGHT SHOULDER: Status: RESOLVED | Noted: 2024-05-14 | Resolved: 2024-06-10

## 2024-06-10 PROBLEM — Z95.1 HISTORY OF CORONARY ARTERY BYPASS SURGERY: Status: RESOLVED | Noted: 2023-02-16 | Resolved: 2024-06-10

## 2024-06-10 PROBLEM — R53.83 FATIGUE: Status: ACTIVE | Noted: 2023-01-20

## 2024-06-10 RX ORDER — CLOPIDOGREL BISULFATE 75 MG/1
TABLET ORAL EVERY 24 HOURS
COMMUNITY

## 2024-06-10 RX ORDER — OMEGA-3 FATTY ACIDS/FISH OIL 340-1000MG
CAPSULE ORAL 2 TIMES DAILY
COMMUNITY

## 2024-06-10 RX ORDER — POLYMYXIN B SULFATE AND TRIMETHOPRIM 1; 10000 MG/ML; [USP'U]/ML
SOLUTION OPHTHALMIC
COMMUNITY
Start: 2024-01-09

## 2024-06-10 RX ORDER — PETROLATUM,WHITE/LANOLIN
OINTMENT (GRAM) TOPICAL
COMMUNITY

## 2024-06-10 RX ORDER — LOSARTAN POTASSIUM 100 MG/1
TABLET ORAL
COMMUNITY

## 2024-06-10 RX ORDER — TRAMADOL HYDROCHLORIDE 50 MG/1
TABLET ORAL
COMMUNITY
Start: 2024-05-02

## 2024-06-10 RX ORDER — AMOXICILLIN AND CLAVULANATE POTASSIUM 875; 125 MG/1; MG/1
1 TABLET, FILM COATED ORAL 2 TIMES DAILY
COMMUNITY
Start: 2024-01-09 | End: 2024-01-16

## 2024-06-10 RX ORDER — LIDOCAINE 560 MG/1
1 PATCH PERCUTANEOUS; TOPICAL; TRANSDERMAL
COMMUNITY
Start: 2024-04-24

## 2024-06-10 RX ORDER — PREDNISONE 20 MG/1
TABLET ORAL
COMMUNITY
Start: 2024-01-12

## 2024-06-10 RX ORDER — METOPROLOL SUCCINATE 50 MG/1
50 TABLET, EXTENDED RELEASE ORAL
COMMUNITY

## 2024-06-10 RX ORDER — GLUCOSAMINE/CHONDRO SU A 500-400 MG
1800 TABLET ORAL 3 TIMES DAILY
COMMUNITY

## 2024-06-10 RX ORDER — OLMESARTAN MEDOXOMIL 40 MG/1
TABLET ORAL
COMMUNITY

## 2024-06-10 RX ORDER — LORATADINE 10 MG/1
1 TABLET ORAL DAILY
COMMUNITY

## 2024-06-10 RX ORDER — CHOLECALCIFEROL (VITAMIN D3) 25 MCG
TABLET ORAL
COMMUNITY

## 2024-06-10 RX ORDER — MOMETASONE FUROATE 50 UG/1
2 SPRAY, METERED NASAL
COMMUNITY

## 2024-06-10 RX ORDER — MINERAL OIL
ENEMA (ML) RECTAL EVERY 24 HOURS
COMMUNITY

## 2024-06-10 RX ORDER — DIPHENHYDRAMINE HCL 25 MG
CAPSULE ORAL EVERY 24 HOURS
COMMUNITY

## 2024-06-10 RX ORDER — ERGOCALCIFEROL 1.25 MG/1
1000 CAPSULE ORAL
COMMUNITY

## 2024-06-10 RX ORDER — DUPILUMAB 300 MG/2ML
INJECTION, SOLUTION SUBCUTANEOUS
COMMUNITY
Start: 2024-02-19

## 2024-06-10 NOTE — PROGRESS NOTES
NPV- LARGE HIATAL HERNIA,     GERD Health Related Quality of Life (HRQL) Questionnaire    Heartburn Questions  1. How bad is the heartburn? Response Scale: 0 = No Symptoms  2. Heartburn when lying down? Response Scale: 0 = No Symptoms  3. Heartburn when standing up? Response Scale: 0 = No Symptoms  4. Heartburn after meals? Response Scale: 0 = No Symptoms  5. Does heartburn change your diet? Response Scale: 0 = No Symptoms  6. Does heartburn wake you from sleep? Response Scale: 0 = No Symptoms    7. Do you have difficulty swallowing? Response Scale: 0 = No Symptoms  8. Do you have pain with swallowing? Response Scale: 0 = No Symptoms  9. Do you have gassy or bloating feelings? Response Scale: 0 = No Symptoms  10. If you take medication, does this affect your daily life? Response Scale: 0 = No Symptoms    Regurgitation Questions  11. How bad is the regurgitation? Response Scale: 0 = No Symptoms  12. Regurgitation when lying down? Response Scale: 0 = No Symptoms  13. Regurgitation when standing up? Response Scale: 0 = No Symptoms  14. Regurgitation after meals? Response Scale: 0 = No Symptoms  15. Does regurgitation change your diet? Response Scale: 0 = No Symptoms  16. Does regurgitation wake you from sleep? Response Scale: 0 = No Symptoms    Are you currently taking any medications for heartburn or GERD? PPI: Yes, medication: omeprazole  How satisfied are you with your present condition? Satisfaction: Satisfied    Total score (sum questions 1-16): 0  Greatest possible score 80 (worst symptoms).  Lowest possible score 0 (no symptoms).    Heartburn score (sum questions 1-6): 0  Worst heartburn symptoms: 30.  No heartburn symptoms: 0.  Score less than or equal to 12 with each individual question not exceeding 2 indicate heartburn elimination.    Regurgitation score (sum questions 11-16): 0  Worst regurgitation symptoms: 30.  No regurgitation symptoms: 0.  Score less than or equal to 12 with each individual question not  exceeding 2 indicate regurgitation elimination.

## 2024-06-11 ENCOUNTER — OFFICE VISIT (OUTPATIENT)
Dept: SURGERY | Facility: CLINIC | Age: 79
End: 2024-06-11
Payer: MEDICARE

## 2024-06-11 VITALS
WEIGHT: 180.6 LBS | SYSTOLIC BLOOD PRESSURE: 140 MMHG | BODY MASS INDEX: 25.86 KG/M2 | OXYGEN SATURATION: 97 % | TEMPERATURE: 98.2 F | DIASTOLIC BLOOD PRESSURE: 76 MMHG | HEIGHT: 70 IN | HEART RATE: 50 BPM | RESPIRATION RATE: 16 BRPM

## 2024-06-11 DIAGNOSIS — R13.19 ESOPHAGEAL DYSPHAGIA: ICD-10-CM

## 2024-06-11 DIAGNOSIS — K44.9 LARGE HIATAL HERNIA: ICD-10-CM

## 2024-06-11 PROCEDURE — 3077F SYST BP >= 140 MM HG: CPT | Performed by: SURGERY

## 2024-06-11 PROCEDURE — 1036F TOBACCO NON-USER: CPT | Performed by: SURGERY

## 2024-06-11 PROCEDURE — 1126F AMNT PAIN NOTED NONE PRSNT: CPT | Performed by: SURGERY

## 2024-06-11 PROCEDURE — 1159F MED LIST DOCD IN RCRD: CPT | Performed by: SURGERY

## 2024-06-11 PROCEDURE — 3078F DIAST BP <80 MM HG: CPT | Performed by: SURGERY

## 2024-06-11 PROCEDURE — 99213 OFFICE O/P EST LOW 20 MIN: CPT | Performed by: SURGERY

## 2024-06-11 ASSESSMENT — ENCOUNTER SYMPTOMS
CONSTITUTIONAL NEGATIVE: 1
ABDOMINAL PAIN: 1
ABDOMINAL DISTENTION: 1
CARDIOVASCULAR NEGATIVE: 1
EYES NEGATIVE: 1
NAUSEA: 1
RESPIRATORY NEGATIVE: 1

## 2024-06-11 ASSESSMENT — PAIN SCALES - GENERAL: PAINLEVEL: 0-NO PAIN

## 2024-06-11 NOTE — PROGRESS NOTES
"Subjective   Patient ID: Henry Alexandre is a 79 y.o. male who presents for No chief complaint on file..  HPI  80 YO F BMI 26 with repeated food impaction, last episode 04/02/2024. Inflamed stricture in the GE junction; performed cold forceps biopsy. Suspected to be due to food impaction, however the mucosa had an adenomatous appearance. Biopsies were obtained. Squamocolumnar junctional mucosa with surface erosion, inflammation, and marked reactive changes. EGD repeated 06/05/2024. Herniation of both GE junction and stomach (type III hiatal hernia), confirmed by retroflexion. There was \"sink trapping\" of the distal esophagus due to the underlying hernia causing distal angulation. This was likely the cause of patient's prior food impaction. Hill grade IV hiatal hernia.       He has previous history of CAD status post CABG in 2003, primary hypertension, mixed hyperlipidemia, hypothyroidism, carotid artery stenosis and gastroesophageal reflux disease. He was diagnosed as having stroke after he had altered mental status and subsequently had an MRI that showed acute nonhemorrhagic infarction in the left basal ganglia and chronic microvascular ischemic changes and multiple old areas of infarction.     HRQL is 0. Patient has no upper esophageal swallowing issues. The wife is present and is concerned about his dementia from the stroke. I recommended Cardiac / neuro approval prior to surgery. He did fine after EGD, knee surgery, cardiac surgery but she is concerned about recovery. Will need esophagram as well prior to scheduling.    Review of Systems   Constitutional: Negative.    HENT: Negative.     Eyes: Negative.    Respiratory: Negative.     Cardiovascular: Negative.    Gastrointestinal:  Positive for abdominal distention, abdominal pain and nausea.       Objective   Physical Exam  Constitutional:       Appearance: Normal appearance.   HENT:      Head: Atraumatic.      Nose: Nose normal.      Mouth/Throat:      Mouth: " Mucous membranes are moist.   Cardiovascular:      Rate and Rhythm: Normal rate and regular rhythm.   Pulmonary:      Effort: Pulmonary effort is normal.      Breath sounds: Normal breath sounds.   Abdominal:      General: There is no distension.      Palpations: Abdomen is soft.      Tenderness: There is no guarding or rebound.   Skin:     General: Skin is warm.   Neurological:      Mental Status: He is alert. Mental status is at baseline.   Psychiatric:         Mood and Affect: Mood normal.         Thought Content: Thought content normal.         Judgment: Judgment normal.         Assessment/Plan   Problem List Items Addressed This Visit             ICD-10-CM       Gastrointestinal and Abdominal    Dysphagia R13.10    Large hiatal hernia K44.9    Relevant Orders    FL GI esophagram   I recommended Cardiac / neuro approval prior to surgery. He did fine after EGD, knee surgery, cardiac surgery but wife is concerned about recovery.   Will need esophagram as well prior to scheduling.         Montrell Griffin MD PhD 06/11/24 8:50 AM

## 2024-07-01 ENCOUNTER — HOSPITAL ENCOUNTER (OUTPATIENT)
Dept: RADIOLOGY | Facility: HOSPITAL | Age: 79
Discharge: HOME | End: 2024-07-01
Payer: MEDICARE

## 2024-07-01 DIAGNOSIS — K44.9 LARGE HIATAL HERNIA: ICD-10-CM

## 2024-07-01 PROCEDURE — 74220 X-RAY XM ESOPHAGUS 1CNTRST: CPT

## 2024-07-01 PROCEDURE — 74220 X-RAY XM ESOPHAGUS 1CNTRST: CPT | Performed by: RADIOLOGY

## 2024-07-02 ENCOUNTER — APPOINTMENT (OUTPATIENT)
Dept: GASTROENTEROLOGY | Facility: CLINIC | Age: 79
End: 2024-07-02
Payer: MEDICARE

## 2024-07-02 NOTE — RESULT ENCOUNTER NOTE
Moderate hiatal hernia with narrowing at the distal esophagus/gastroesophageal junction may be due to  compression from hiatal hernia. Pursue Cardiac / neuro approval prior to surgery. Return to clinic to schedule surgery.

## 2024-07-03 ENCOUNTER — HOSPITAL ENCOUNTER (OUTPATIENT)
Facility: HOSPITAL | Age: 79
Setting detail: OUTPATIENT SURGERY
End: 2024-07-03
Attending: SURGERY | Admitting: SURGERY
Payer: MEDICARE

## 2024-07-03 ENCOUNTER — PREP FOR PROCEDURE (OUTPATIENT)
Dept: SURGERY | Facility: CLINIC | Age: 79
End: 2024-07-03
Payer: MEDICARE

## 2024-07-03 DIAGNOSIS — K44.9 HIATAL HERNIA: Primary | ICD-10-CM

## 2024-07-03 RX ORDER — GABAPENTIN 600 MG/1
600 TABLET ORAL ONCE
OUTPATIENT
Start: 2024-07-03 | End: 2024-07-03

## 2024-07-03 RX ORDER — CEFAZOLIN SODIUM 2 G/100ML
2 INJECTION, SOLUTION INTRAVENOUS ONCE
OUTPATIENT
Start: 2024-07-03 | End: 2024-07-03

## 2024-07-03 RX ORDER — SODIUM CHLORIDE, SODIUM LACTATE, POTASSIUM CHLORIDE, CALCIUM CHLORIDE 600; 310; 30; 20 MG/100ML; MG/100ML; MG/100ML; MG/100ML
100 INJECTION, SOLUTION INTRAVENOUS CONTINUOUS
OUTPATIENT
Start: 2024-07-03

## 2024-07-03 RX ORDER — ACETAMINOPHEN 325 MG/1
975 TABLET ORAL ONCE
OUTPATIENT
Start: 2024-07-03 | End: 2024-07-03

## 2024-07-03 RX ORDER — CELECOXIB 400 MG/1
400 CAPSULE ORAL ONCE
OUTPATIENT
Start: 2024-07-03 | End: 2024-07-03

## 2024-07-03 RX ORDER — HEPARIN SODIUM 5000 [USP'U]/ML
5000 INJECTION, SOLUTION INTRAVENOUS; SUBCUTANEOUS ONCE
OUTPATIENT
Start: 2024-07-03 | End: 2024-07-03

## 2024-07-03 RX ORDER — SCOLOPAMINE TRANSDERMAL SYSTEM 1 MG/1
1 PATCH, EXTENDED RELEASE TRANSDERMAL
OUTPATIENT
Start: 2024-07-03 | End: 2024-07-06

## 2024-07-03 NOTE — RESULT ENCOUNTER NOTE
Clearances are faxed successfully to cardiac and neurology.  They are rescheduled for a preoperative appointment to accommodate August 12 for surgical date.     Juli Mason MA III  General Surgery  Dr. Montrell Griffin  [phone]

## 2024-07-09 ENCOUNTER — APPOINTMENT (OUTPATIENT)
Dept: SURGERY | Facility: CLINIC | Age: 79
End: 2024-07-09
Payer: MEDICARE

## 2024-07-18 ENCOUNTER — TELEPHONE (OUTPATIENT)
Dept: GASTROENTEROLOGY | Facility: CLINIC | Age: 79
End: 2024-07-18
Payer: MEDICARE

## 2024-07-18 NOTE — TELEPHONE ENCOUNTER
Called to follow up regarding cancellation of surgery. Spoke with  Bettie who stated they find it best to stay at the Clinic where patient's cardiologist, and neurologist are for the procedure.  They will be scheduling at the clinic.

## 2024-07-18 NOTE — TELEPHONE ENCOUNTER
Patient's wife called today requesting to cancel procedure with Dr. Griffin   She states that they have chosen another doctor for this concern.

## 2024-08-06 ENCOUNTER — APPOINTMENT (OUTPATIENT)
Dept: SURGERY | Facility: CLINIC | Age: 79
End: 2024-08-06
Payer: MEDICARE

## 2024-11-25 ENCOUNTER — APPOINTMENT (OUTPATIENT)
Dept: RADIOLOGY | Facility: HOSPITAL | Age: 79
End: 2024-11-25
Payer: MEDICARE

## 2024-11-25 ENCOUNTER — HOSPITAL ENCOUNTER (INPATIENT)
Facility: HOSPITAL | Age: 79
LOS: 2 days | Discharge: HOME | End: 2024-11-27
Attending: EMERGENCY MEDICINE | Admitting: INTERNAL MEDICINE
Payer: MEDICARE

## 2024-11-25 ENCOUNTER — APPOINTMENT (OUTPATIENT)
Dept: CARDIOLOGY | Facility: HOSPITAL | Age: 79
End: 2024-11-25
Payer: MEDICARE

## 2024-11-25 DIAGNOSIS — K44.9 LARGE HIATAL HERNIA: ICD-10-CM

## 2024-11-25 DIAGNOSIS — A41.9 SEPSIS, DUE TO UNSPECIFIED ORGANISM, UNSPECIFIED WHETHER ACUTE ORGAN DYSFUNCTION PRESENT (MULTI): ICD-10-CM

## 2024-11-25 DIAGNOSIS — I25.10 CORONARY ARTERY DISEASE INVOLVING NATIVE CORONARY ARTERY OF NATIVE HEART WITHOUT ANGINA PECTORIS: ICD-10-CM

## 2024-11-25 DIAGNOSIS — I63.533: ICD-10-CM

## 2024-11-25 DIAGNOSIS — I25.10 CORONARY ARTERY DISEASE INVOLVING NATIVE CORONARY ARTERY OF NATIVE HEART, UNSPECIFIED WHETHER ANGINA PRESENT: ICD-10-CM

## 2024-11-25 DIAGNOSIS — I63.9 CEREBRAL INFARCTION, UNSPECIFIED: ICD-10-CM

## 2024-11-25 DIAGNOSIS — G45.9 MINI STROKE: ICD-10-CM

## 2024-11-25 DIAGNOSIS — K44.9 HIATAL HERNIA: ICD-10-CM

## 2024-11-25 DIAGNOSIS — R41.82 ALTERED MENTAL STATUS, UNSPECIFIED ALTERED MENTAL STATUS TYPE: Primary | ICD-10-CM

## 2024-11-25 LAB
ALBUMIN SERPL BCP-MCNC: 4.7 G/DL (ref 3.4–5)
ALP SERPL-CCNC: 70 U/L (ref 33–136)
ALT SERPL W P-5'-P-CCNC: 21 U/L (ref 10–52)
AMMONIA PLAS-SCNC: 32 UMOL/L (ref 16–53)
AMPHETAMINES UR QL SCN: NORMAL
ANION GAP BLDV CALCULATED.4IONS-SCNC: 10 MMOL/L (ref 10–25)
ANION GAP SERPL CALC-SCNC: 11 MMOL/L (ref 10–20)
APAP SERPL-MCNC: <10 UG/ML
APPEARANCE UR: CLEAR
APTT PPP: 30 SECONDS (ref 27–38)
AST SERPL W P-5'-P-CCNC: 21 U/L (ref 9–39)
BARBITURATES UR QL SCN: NORMAL
BASE EXCESS BLDV CALC-SCNC: 6.7 MMOL/L (ref -2–3)
BASOPHILS # BLD AUTO: 0.06 X10*3/UL (ref 0–0.1)
BASOPHILS NFR BLD AUTO: 0.5 %
BENZODIAZ UR QL SCN: NORMAL
BILIRUB SERPL-MCNC: 0.7 MG/DL (ref 0–1.2)
BILIRUB UR STRIP.AUTO-MCNC: NEGATIVE MG/DL
BNP SERPL-MCNC: 62 PG/ML (ref 0–99)
BODY TEMPERATURE: 37 DEGREES CELSIUS
BUN SERPL-MCNC: 20 MG/DL (ref 6–23)
BZE UR QL SCN: NORMAL
CA-I BLDV-SCNC: 1.15 MMOL/L (ref 1.1–1.33)
CALCIUM SERPL-MCNC: 10.1 MG/DL (ref 8.6–10.3)
CANNABINOIDS UR QL SCN: NORMAL
CARDIAC TROPONIN I PNL SERPL HS: 10 NG/L (ref 0–20)
CARDIAC TROPONIN I PNL SERPL HS: 14 NG/L (ref 0–20)
CHLORIDE BLDV-SCNC: 94 MMOL/L (ref 98–107)
CHLORIDE SERPL-SCNC: 97 MMOL/L (ref 98–107)
CHOLEST SERPL-MCNC: 123 MG/DL (ref 0–199)
CHOLESTEROL/HDL RATIO: 3.2
CO2 SERPL-SCNC: 32 MMOL/L (ref 21–32)
COLOR UR: NORMAL
CREAT SERPL-MCNC: 0.82 MG/DL (ref 0.5–1.3)
EGFRCR SERPLBLD CKD-EPI 2021: 88 ML/MIN/1.73M*2
EOSINOPHIL # BLD AUTO: 0.24 X10*3/UL (ref 0–0.4)
EOSINOPHIL NFR BLD AUTO: 1.9 %
ERYTHROCYTE [DISTWIDTH] IN BLOOD BY AUTOMATED COUNT: 12.4 % (ref 11.5–14.5)
ETHANOL SERPL-MCNC: <10 MG/DL
FENTANYL+NORFENTANYL UR QL SCN: NORMAL
FLUAV RNA RESP QL NAA+PROBE: NOT DETECTED
FLUBV RNA RESP QL NAA+PROBE: NOT DETECTED
GLUCOSE BLD MANUAL STRIP-MCNC: 186 MG/DL (ref 74–99)
GLUCOSE BLD MANUAL STRIP-MCNC: 88 MG/DL (ref 74–99)
GLUCOSE BLD MANUAL STRIP-MCNC: 92 MG/DL (ref 74–99)
GLUCOSE BLDV-MCNC: 182 MG/DL (ref 74–99)
GLUCOSE SERPL-MCNC: 98 MG/DL (ref 74–99)
GLUCOSE UR STRIP.AUTO-MCNC: NORMAL MG/DL
HCO3 BLDV-SCNC: 28.9 MMOL/L (ref 22–26)
HCT VFR BLD AUTO: 46.2 % (ref 41–52)
HCT VFR BLD EST: 44 % (ref 41–52)
HDLC SERPL-MCNC: 38.4 MG/DL
HGB BLD-MCNC: 16.1 G/DL (ref 13.5–17.5)
HGB BLDV-MCNC: 14.8 G/DL (ref 13.5–17.5)
HOLD SPECIMEN: NORMAL
HOLD SPECIMEN: NORMAL
IMM GRANULOCYTES # BLD AUTO: 0.05 X10*3/UL (ref 0–0.5)
IMM GRANULOCYTES NFR BLD AUTO: 0.4 % (ref 0–0.9)
INHALED O2 CONCENTRATION: 28 %
INR PPP: 1 (ref 0.9–1.1)
KETONES UR STRIP.AUTO-MCNC: NEGATIVE MG/DL
LACTATE BLDV-SCNC: 2.3 MMOL/L (ref 0.4–2)
LACTATE SERPL-SCNC: 1.2 MMOL/L (ref 0.4–2)
LDLC SERPL CALC-MCNC: 58 MG/DL
LEUKOCYTE ESTERASE UR QL STRIP.AUTO: NEGATIVE
LIPASE SERPL-CCNC: 44 U/L (ref 9–82)
LYMPHOCYTES # BLD AUTO: 2.67 X10*3/UL (ref 0.8–3)
LYMPHOCYTES NFR BLD AUTO: 20.8 %
MCH RBC QN AUTO: 32.9 PG (ref 26–34)
MCHC RBC AUTO-ENTMCNC: 34.8 G/DL (ref 32–36)
MCV RBC AUTO: 95 FL (ref 80–100)
METHADONE UR QL SCN: NORMAL
MONOCYTES # BLD AUTO: 0.81 X10*3/UL (ref 0.05–0.8)
MONOCYTES NFR BLD AUTO: 6.3 %
NEUTROPHILS # BLD AUTO: 9.03 X10*3/UL (ref 1.6–5.5)
NEUTROPHILS NFR BLD AUTO: 70.1 %
NITRITE UR QL STRIP.AUTO: NEGATIVE
NON HDL CHOLESTEROL: 85 MG/DL (ref 0–149)
NRBC BLD-RTO: 0 /100 WBCS (ref 0–0)
OPIATES UR QL SCN: NORMAL
OXYCODONE+OXYMORPHONE UR QL SCN: NORMAL
OXYHGB MFR BLDV: 77.2 % (ref 45–75)
PCO2 BLDV: 33 MM HG (ref 41–51)
PCP UR QL SCN: NORMAL
PH BLDV: 7.55 PH (ref 7.33–7.43)
PH UR STRIP.AUTO: 7.5 [PH]
PLATELET # BLD AUTO: 294 X10*3/UL (ref 150–450)
PO2 BLDV: 46 MM HG (ref 35–45)
POTASSIUM BLDV-SCNC: 3.1 MMOL/L (ref 3.5–5.3)
POTASSIUM SERPL-SCNC: 3.4 MMOL/L (ref 3.5–5.3)
PROT SERPL-MCNC: 7.5 G/DL (ref 6.4–8.2)
PROT UR STRIP.AUTO-MCNC: NEGATIVE MG/DL
PROTHROMBIN TIME: 11.1 SECONDS (ref 9.8–12.8)
RBC # BLD AUTO: 4.89 X10*6/UL (ref 4.5–5.9)
RBC # UR STRIP.AUTO: NEGATIVE /UL
SALICYLATES SERPL-MCNC: <3 MG/DL
SAO2 % BLDV: 79 % (ref 45–75)
SARS-COV-2 RNA RESP QL NAA+PROBE: NOT DETECTED
SODIUM BLDV-SCNC: 130 MMOL/L (ref 136–145)
SODIUM SERPL-SCNC: 137 MMOL/L (ref 136–145)
SP GR UR STRIP.AUTO: 1.01
TRIGL SERPL-MCNC: 135 MG/DL (ref 0–149)
TSH SERPL-ACNC: 1.08 MIU/L (ref 0.44–3.98)
UROBILINOGEN UR STRIP.AUTO-MCNC: NORMAL MG/DL
VLDL: 27 MG/DL (ref 0–40)
WBC # BLD AUTO: 12.9 X10*3/UL (ref 4.4–11.3)

## 2024-11-25 PROCEDURE — 85610 PROTHROMBIN TIME: CPT | Performed by: EMERGENCY MEDICINE

## 2024-11-25 PROCEDURE — 70551 MRI BRAIN STEM W/O DYE: CPT

## 2024-11-25 PROCEDURE — 99223 1ST HOSP IP/OBS HIGH 75: CPT

## 2024-11-25 PROCEDURE — 82947 ASSAY GLUCOSE BLOOD QUANT: CPT

## 2024-11-25 PROCEDURE — 2500000002 HC RX 250 W HCPCS SELF ADMINISTERED DRUGS (ALT 637 FOR MEDICARE OP, ALT 636 FOR OP/ED)

## 2024-11-25 PROCEDURE — 2550000001 HC RX 255 CONTRASTS: Performed by: EMERGENCY MEDICINE

## 2024-11-25 PROCEDURE — 2500000004 HC RX 250 GENERAL PHARMACY W/ HCPCS (ALT 636 FOR OP/ED): Performed by: INTERNAL MEDICINE

## 2024-11-25 PROCEDURE — 70547 MR ANGIOGRAPHY NECK W/O DYE: CPT

## 2024-11-25 PROCEDURE — 84443 ASSAY THYROID STIM HORMONE: CPT | Performed by: NURSE PRACTITIONER

## 2024-11-25 PROCEDURE — 70551 MRI BRAIN STEM W/O DYE: CPT | Performed by: STUDENT IN AN ORGANIZED HEALTH CARE EDUCATION/TRAINING PROGRAM

## 2024-11-25 PROCEDURE — 87636 SARSCOV2 & INF A&B AMP PRB: CPT | Performed by: NURSE PRACTITIONER

## 2024-11-25 PROCEDURE — 96375 TX/PRO/DX INJ NEW DRUG ADDON: CPT

## 2024-11-25 PROCEDURE — 2500000001 HC RX 250 WO HCPCS SELF ADMINISTERED DRUGS (ALT 637 FOR MEDICARE OP)

## 2024-11-25 PROCEDURE — 80061 LIPID PANEL: CPT

## 2024-11-25 PROCEDURE — 80320 DRUG SCREEN QUANTALCOHOLS: CPT | Performed by: NURSE PRACTITIONER

## 2024-11-25 PROCEDURE — 96367 TX/PROPH/DG ADDL SEQ IV INF: CPT

## 2024-11-25 PROCEDURE — 83690 ASSAY OF LIPASE: CPT | Performed by: NURSE PRACTITIONER

## 2024-11-25 PROCEDURE — 85730 THROMBOPLASTIN TIME PARTIAL: CPT | Performed by: EMERGENCY MEDICINE

## 2024-11-25 PROCEDURE — 84484 ASSAY OF TROPONIN QUANT: CPT | Performed by: NURSE PRACTITIONER

## 2024-11-25 PROCEDURE — 74177 CT ABD & PELVIS W/CONTRAST: CPT | Performed by: RADIOLOGY

## 2024-11-25 PROCEDURE — 80307 DRUG TEST PRSMV CHEM ANLYZR: CPT | Performed by: NURSE PRACTITIONER

## 2024-11-25 PROCEDURE — 80053 COMPREHEN METABOLIC PANEL: CPT | Performed by: EMERGENCY MEDICINE

## 2024-11-25 PROCEDURE — 2500000001 HC RX 250 WO HCPCS SELF ADMINISTERED DRUGS (ALT 637 FOR MEDICARE OP): Performed by: NURSE PRACTITIONER

## 2024-11-25 PROCEDURE — 71045 X-RAY EXAM CHEST 1 VIEW: CPT | Performed by: RADIOLOGY

## 2024-11-25 PROCEDURE — 96366 THER/PROPH/DIAG IV INF ADDON: CPT

## 2024-11-25 PROCEDURE — 99291 CRITICAL CARE FIRST HOUR: CPT | Mod: 25 | Performed by: NURSE PRACTITIONER

## 2024-11-25 PROCEDURE — 36415 COLL VENOUS BLD VENIPUNCTURE: CPT | Performed by: EMERGENCY MEDICINE

## 2024-11-25 PROCEDURE — 2500000004 HC RX 250 GENERAL PHARMACY W/ HCPCS (ALT 636 FOR OP/ED)

## 2024-11-25 PROCEDURE — 84145 PROCALCITONIN (PCT): CPT | Mod: PARLAB

## 2024-11-25 PROCEDURE — 96372 THER/PROPH/DIAG INJ SC/IM: CPT | Performed by: NURSE PRACTITIONER

## 2024-11-25 PROCEDURE — 2500000004 HC RX 250 GENERAL PHARMACY W/ HCPCS (ALT 636 FOR OP/ED): Performed by: NURSE PRACTITIONER

## 2024-11-25 PROCEDURE — 84484 ASSAY OF TROPONIN QUANT: CPT | Performed by: EMERGENCY MEDICINE

## 2024-11-25 PROCEDURE — 96365 THER/PROPH/DIAG IV INF INIT: CPT | Mod: 59

## 2024-11-25 PROCEDURE — 74177 CT ABD & PELVIS W/CONTRAST: CPT

## 2024-11-25 PROCEDURE — 83880 ASSAY OF NATRIURETIC PEPTIDE: CPT | Performed by: NURSE PRACTITIONER

## 2024-11-25 PROCEDURE — 83605 ASSAY OF LACTIC ACID: CPT | Performed by: EMERGENCY MEDICINE

## 2024-11-25 PROCEDURE — 70450 CT HEAD/BRAIN W/O DYE: CPT | Performed by: RADIOLOGY

## 2024-11-25 PROCEDURE — 70547 MR ANGIOGRAPHY NECK W/O DYE: CPT | Performed by: STUDENT IN AN ORGANIZED HEALTH CARE EDUCATION/TRAINING PROGRAM

## 2024-11-25 PROCEDURE — 87040 BLOOD CULTURE FOR BACTERIA: CPT | Mod: PARLAB | Performed by: EMERGENCY MEDICINE

## 2024-11-25 PROCEDURE — 1200000002 HC GENERAL ROOM WITH TELEMETRY DAILY

## 2024-11-25 PROCEDURE — 82140 ASSAY OF AMMONIA: CPT | Performed by: NURSE PRACTITIONER

## 2024-11-25 PROCEDURE — 84132 ASSAY OF SERUM POTASSIUM: CPT | Performed by: NURSE PRACTITIONER

## 2024-11-25 PROCEDURE — 70450 CT HEAD/BRAIN W/O DYE: CPT

## 2024-11-25 PROCEDURE — 85025 COMPLETE CBC W/AUTO DIFF WBC: CPT | Performed by: EMERGENCY MEDICINE

## 2024-11-25 PROCEDURE — 70544 MR ANGIOGRAPHY HEAD W/O DYE: CPT | Performed by: STUDENT IN AN ORGANIZED HEALTH CARE EDUCATION/TRAINING PROGRAM

## 2024-11-25 PROCEDURE — 2500000004 HC RX 250 GENERAL PHARMACY W/ HCPCS (ALT 636 FOR OP/ED): Performed by: EMERGENCY MEDICINE

## 2024-11-25 PROCEDURE — 81003 URINALYSIS AUTO W/O SCOPE: CPT | Performed by: EMERGENCY MEDICINE

## 2024-11-25 PROCEDURE — 71045 X-RAY EXAM CHEST 1 VIEW: CPT

## 2024-11-25 PROCEDURE — 93005 ELECTROCARDIOGRAM TRACING: CPT

## 2024-11-25 PROCEDURE — 70544 MR ANGIOGRAPHY HEAD W/O DYE: CPT

## 2024-11-25 PROCEDURE — 83036 HEMOGLOBIN GLYCOSYLATED A1C: CPT

## 2024-11-25 RX ORDER — ASPIRIN 81 MG/1
81 TABLET ORAL DAILY
Status: DISCONTINUED | OUTPATIENT
Start: 2024-11-25 | End: 2024-11-25

## 2024-11-25 RX ORDER — ACETAMINOPHEN 160 MG/5ML
650 SOLUTION ORAL EVERY 4 HOURS PRN
Status: DISCONTINUED | OUTPATIENT
Start: 2024-11-25 | End: 2024-11-27 | Stop reason: HOSPADM

## 2024-11-25 RX ORDER — NAPROXEN SODIUM 220 MG/1
81 TABLET, FILM COATED ORAL EVERY MORNING
COMMUNITY

## 2024-11-25 RX ORDER — LABETALOL HYDROCHLORIDE 5 MG/ML
10 INJECTION, SOLUTION INTRAVENOUS EVERY 10 MIN PRN
Status: ACTIVE | OUTPATIENT
Start: 2024-11-25 | End: 2024-11-27

## 2024-11-25 RX ORDER — ENOXAPARIN SODIUM 100 MG/ML
40 INJECTION SUBCUTANEOUS EVERY 24 HOURS
Status: DISCONTINUED | OUTPATIENT
Start: 2024-11-25 | End: 2024-11-27 | Stop reason: HOSPADM

## 2024-11-25 RX ORDER — LEVOTHYROXINE SODIUM 75 UG/1
75 TABLET ORAL
Status: DISCONTINUED | OUTPATIENT
Start: 2024-11-26 | End: 2024-11-27 | Stop reason: HOSPADM

## 2024-11-25 RX ORDER — POTASSIUM CHLORIDE 14.9 MG/ML
20 INJECTION INTRAVENOUS
Status: DISCONTINUED | OUTPATIENT
Start: 2024-11-25 | End: 2024-11-25

## 2024-11-25 RX ORDER — PANTOPRAZOLE SODIUM 40 MG/1
40 TABLET, DELAYED RELEASE ORAL
Status: DISCONTINUED | OUTPATIENT
Start: 2024-11-25 | End: 2024-11-27 | Stop reason: HOSPADM

## 2024-11-25 RX ORDER — CLOPIDOGREL BISULFATE 75 MG/1
75 TABLET ORAL DAILY
Status: DISCONTINUED | OUTPATIENT
Start: 2024-11-25 | End: 2024-11-27 | Stop reason: HOSPADM

## 2024-11-25 RX ORDER — PANTOPRAZOLE SODIUM 40 MG/10ML
40 INJECTION, POWDER, LYOPHILIZED, FOR SOLUTION INTRAVENOUS DAILY
Status: DISCONTINUED | OUTPATIENT
Start: 2024-11-25 | End: 2024-11-27 | Stop reason: SDUPTHER

## 2024-11-25 RX ORDER — ACETAMINOPHEN 325 MG/1
650 TABLET ORAL ONCE
Status: DISCONTINUED | OUTPATIENT
Start: 2024-11-25 | End: 2024-11-25

## 2024-11-25 RX ORDER — ACETAMINOPHEN 325 MG/1
650 TABLET ORAL EVERY 4 HOURS PRN
Status: DISCONTINUED | OUTPATIENT
Start: 2024-11-25 | End: 2024-11-27 | Stop reason: HOSPADM

## 2024-11-25 RX ORDER — OLANZAPINE 10 MG/2ML
2.5 INJECTION, POWDER, FOR SOLUTION INTRAMUSCULAR ONCE AS NEEDED
Status: DISCONTINUED | OUTPATIENT
Start: 2024-11-25 | End: 2024-11-27 | Stop reason: HOSPADM

## 2024-11-25 RX ORDER — CETIRIZINE HYDROCHLORIDE 10 MG/1
10 TABLET ORAL EVERY MORNING
Status: DISCONTINUED | OUTPATIENT
Start: 2024-11-26 | End: 2024-11-27 | Stop reason: HOSPADM

## 2024-11-25 RX ORDER — HALOPERIDOL 5 MG/ML
INJECTION INTRAMUSCULAR
Status: DISCONTINUED
Start: 2024-11-25 | End: 2024-11-25 | Stop reason: WASHOUT

## 2024-11-25 RX ORDER — HYDRALAZINE HYDROCHLORIDE 20 MG/ML
10 INJECTION INTRAMUSCULAR; INTRAVENOUS
Status: ACTIVE | OUTPATIENT
Start: 2024-11-25 | End: 2024-11-27

## 2024-11-25 RX ORDER — ATORVASTATIN CALCIUM 80 MG/1
80 TABLET, FILM COATED ORAL NIGHTLY
Status: DISCONTINUED | OUTPATIENT
Start: 2024-11-25 | End: 2024-11-27 | Stop reason: HOSPADM

## 2024-11-25 RX ORDER — CETIRIZINE HYDROCHLORIDE 10 MG/1
10 TABLET ORAL EVERY MORNING
COMMUNITY

## 2024-11-25 RX ORDER — NAPROXEN SODIUM 220 MG/1
81 TABLET, FILM COATED ORAL EVERY MORNING
Status: DISCONTINUED | OUTPATIENT
Start: 2024-11-26 | End: 2024-11-27 | Stop reason: HOSPADM

## 2024-11-25 RX ORDER — AMLODIPINE BESYLATE 10 MG/1
10 TABLET ORAL NIGHTLY
Status: DISCONTINUED | OUTPATIENT
Start: 2024-11-25 | End: 2024-11-27 | Stop reason: HOSPADM

## 2024-11-25 RX ORDER — HALOPERIDOL 5 MG/ML
2 INJECTION INTRAMUSCULAR ONCE
Status: DISCONTINUED | OUTPATIENT
Start: 2024-11-25 | End: 2024-11-25

## 2024-11-25 RX ORDER — METOPROLOL SUCCINATE 25 MG/1
25 TABLET, EXTENDED RELEASE ORAL
COMMUNITY

## 2024-11-25 RX ORDER — TRIAMTERENE/HYDROCHLOROTHIAZID 37.5-25 MG
1 TABLET ORAL DAILY
Status: DISCONTINUED | OUTPATIENT
Start: 2024-11-26 | End: 2024-11-27 | Stop reason: HOSPADM

## 2024-11-25 RX ORDER — MORPHINE SULFATE 2 MG/ML
2 INJECTION, SOLUTION INTRAMUSCULAR; INTRAVENOUS ONCE
Status: COMPLETED | OUTPATIENT
Start: 2024-11-25 | End: 2024-11-25

## 2024-11-25 RX ORDER — FLUTICASONE PROPIONATE 93 UG/1
2 SPRAY, METERED NASAL 2 TIMES DAILY
COMMUNITY

## 2024-11-25 RX ORDER — ACETAMINOPHEN 650 MG/1
650 SUPPOSITORY RECTAL ONCE
Status: COMPLETED | OUTPATIENT
Start: 2024-11-25 | End: 2024-11-25

## 2024-11-25 RX ORDER — TAMSULOSIN HYDROCHLORIDE 0.4 MG/1
0.4 CAPSULE ORAL NIGHTLY
Status: DISCONTINUED | OUTPATIENT
Start: 2024-11-25 | End: 2024-11-27 | Stop reason: HOSPADM

## 2024-11-25 RX ORDER — HYDRALAZINE HYDROCHLORIDE 25 MG/1
25 TABLET, FILM COATED ORAL EVERY 6 HOURS PRN
Status: DISCONTINUED | OUTPATIENT
Start: 2024-11-27 | End: 2024-11-27 | Stop reason: HOSPADM

## 2024-11-25 RX ORDER — METOPROLOL SUCCINATE 25 MG/1
25 TABLET, EXTENDED RELEASE ORAL
Status: DISCONTINUED | OUTPATIENT
Start: 2024-11-25 | End: 2024-11-27 | Stop reason: HOSPADM

## 2024-11-25 RX ORDER — ACETAMINOPHEN 650 MG/1
650 SUPPOSITORY RECTAL EVERY 4 HOURS PRN
Status: DISCONTINUED | OUTPATIENT
Start: 2024-11-25 | End: 2024-11-27 | Stop reason: HOSPADM

## 2024-11-25 RX ORDER — OLANZAPINE 10 MG/2ML
5 INJECTION, POWDER, FOR SOLUTION INTRAMUSCULAR ONCE AS NEEDED
Status: COMPLETED | OUTPATIENT
Start: 2024-11-25 | End: 2024-11-25

## 2024-11-25 RX ORDER — ATORVASTATIN CALCIUM 40 MG/1
80 TABLET, FILM COATED ORAL NIGHTLY
Status: DISCONTINUED | OUTPATIENT
Start: 2024-11-25 | End: 2024-11-25

## 2024-11-25 RX ORDER — HALOPERIDOL 5 MG/ML
2 INJECTION INTRAMUSCULAR ONCE
Status: COMPLETED | OUTPATIENT
Start: 2024-11-25 | End: 2024-11-25

## 2024-11-25 RX ORDER — LISINOPRIL 20 MG/1
20 TABLET ORAL DAILY
Status: DISCONTINUED | OUTPATIENT
Start: 2024-11-26 | End: 2024-11-27 | Stop reason: HOSPADM

## 2024-11-25 SDOH — ECONOMIC STABILITY: FOOD INSECURITY: WITHIN THE PAST 12 MONTHS, YOU WORRIED THAT YOUR FOOD WOULD RUN OUT BEFORE YOU GOT THE MONEY TO BUY MORE.: NEVER TRUE

## 2024-11-25 SDOH — ECONOMIC STABILITY: FOOD INSECURITY: WITHIN THE PAST 12 MONTHS, THE FOOD YOU BOUGHT JUST DIDN'T LAST AND YOU DIDN'T HAVE MONEY TO GET MORE.: NEVER TRUE

## 2024-11-25 SDOH — SOCIAL STABILITY: SOCIAL INSECURITY: HAVE YOU HAD THOUGHTS OF HARMING ANYONE ELSE?: NO

## 2024-11-25 SDOH — SOCIAL STABILITY: SOCIAL INSECURITY: WITHIN THE LAST YEAR, HAVE YOU BEEN AFRAID OF YOUR PARTNER OR EX-PARTNER?: PATIENT UNABLE TO ANSWER

## 2024-11-25 SDOH — SOCIAL STABILITY: SOCIAL INSECURITY: WERE YOU ABLE TO COMPLETE ALL THE BEHAVIORAL HEALTH SCREENINGS?: NO

## 2024-11-25 SDOH — SOCIAL STABILITY: SOCIAL INSECURITY: DO YOU FEEL ANYONE HAS EXPLOITED OR TAKEN ADVANTAGE OF YOU FINANCIALLY OR OF YOUR PERSONAL PROPERTY?: UNABLE TO ASSESS

## 2024-11-25 SDOH — SOCIAL STABILITY: SOCIAL INSECURITY: ARE THERE ANY APPARENT SIGNS OF INJURIES/BEHAVIORS THAT COULD BE RELATED TO ABUSE/NEGLECT?: NO

## 2024-11-25 SDOH — ECONOMIC STABILITY: INCOME INSECURITY: IN THE PAST 12 MONTHS HAS THE ELECTRIC, GAS, OIL, OR WATER COMPANY THREATENED TO SHUT OFF SERVICES IN YOUR HOME?: NO

## 2024-11-25 SDOH — SOCIAL STABILITY: SOCIAL INSECURITY
WITHIN THE LAST YEAR, HAVE YOU BEEN HUMILIATED OR EMOTIONALLY ABUSED IN OTHER WAYS BY YOUR PARTNER OR EX-PARTNER?: PATIENT UNABLE TO ANSWER

## 2024-11-25 SDOH — SOCIAL STABILITY: SOCIAL INSECURITY: ARE YOU OR HAVE YOU BEEN THREATENED OR ABUSED PHYSICALLY, EMOTIONALLY, OR SEXUALLY BY ANYONE?: UNABLE TO ASSESS

## 2024-11-25 SDOH — SOCIAL STABILITY: SOCIAL INSECURITY: DO YOU FEEL UNSAFE GOING BACK TO THE PLACE WHERE YOU ARE LIVING?: UNABLE TO ASSESS

## 2024-11-25 SDOH — SOCIAL STABILITY: SOCIAL INSECURITY
WITHIN THE LAST YEAR, HAVE YOU BEEN KICKED, HIT, SLAPPED, OR OTHERWISE PHYSICALLY HURT BY YOUR PARTNER OR EX-PARTNER?: PATIENT UNABLE TO ANSWER

## 2024-11-25 SDOH — SOCIAL STABILITY: SOCIAL INSECURITY: DOES ANYONE TRY TO KEEP YOU FROM HAVING/CONTACTING OTHER FRIENDS OR DOING THINGS OUTSIDE YOUR HOME?: UNABLE TO ASSESS

## 2024-11-25 SDOH — SOCIAL STABILITY: SOCIAL INSECURITY
WITHIN THE LAST YEAR, HAVE YOU BEEN RAPED OR FORCED TO HAVE ANY KIND OF SEXUAL ACTIVITY BY YOUR PARTNER OR EX-PARTNER?: PATIENT UNABLE TO ANSWER

## 2024-11-25 SDOH — SOCIAL STABILITY: SOCIAL INSECURITY: ABUSE: ADULT

## 2024-11-25 SDOH — SOCIAL STABILITY: SOCIAL INSECURITY: HAS ANYONE EVER THREATENED TO HURT YOUR FAMILY OR YOUR PETS?: UNABLE TO ASSESS

## 2024-11-25 SDOH — SOCIAL STABILITY: SOCIAL INSECURITY: HAVE YOU HAD ANY THOUGHTS OF HARMING ANYONE ELSE?: UNABLE TO ASSESS

## 2024-11-25 ASSESSMENT — PATIENT HEALTH QUESTIONNAIRE - PHQ9
SUM OF ALL RESPONSES TO PHQ9 QUESTIONS 1 & 2: 0
2. FEELING DOWN, DEPRESSED OR HOPELESS: NOT AT ALL
1. LITTLE INTEREST OR PLEASURE IN DOING THINGS: NOT AT ALL

## 2024-11-25 ASSESSMENT — LIFESTYLE VARIABLES
SKIP TO QUESTIONS 9-10: 1
HOW OFTEN DO YOU HAVE 6 OR MORE DRINKS ON ONE OCCASION: NEVER
HOW OFTEN DO YOU HAVE A DRINK CONTAINING ALCOHOL: NEVER
HOW MANY STANDARD DRINKS CONTAINING ALCOHOL DO YOU HAVE ON A TYPICAL DAY: PATIENT DOES NOT DRINK
AUDIT-C TOTAL SCORE: 0
SUBSTANCE_ABUSE_PAST_12_MONTHS: NO
AUDIT-C TOTAL SCORE: 0
PRESCIPTION_ABUSE_PAST_12_MONTHS: NO

## 2024-11-25 ASSESSMENT — COGNITIVE AND FUNCTIONAL STATUS - GENERAL
MOVING TO AND FROM BED TO CHAIR: A LITTLE
CLIMB 3 TO 5 STEPS WITH RAILING: A LOT
PERSONAL GROOMING: A LOT
TURNING FROM BACK TO SIDE WHILE IN FLAT BAD: A LITTLE
DRESSING REGULAR LOWER BODY CLOTHING: A LOT
STANDING UP FROM CHAIR USING ARMS: A LOT
HELP NEEDED FOR BATHING: A LOT
DRESSING REGULAR UPPER BODY CLOTHING: A LOT
MOBILITY SCORE: 15
MOVING FROM LYING ON BACK TO SITTING ON SIDE OF FLAT BED WITH BEDRAILS: A LITTLE
PATIENT BASELINE BEDBOUND: NO
WALKING IN HOSPITAL ROOM: A LOT
TOILETING: A LOT
DAILY ACTIVITIY SCORE: 12
EATING MEALS: A LOT

## 2024-11-25 ASSESSMENT — ACTIVITIES OF DAILY LIVING (ADL)
BATHING: NEEDS ASSISTANCE
ADEQUATE_TO_COMPLETE_ADL: YES
PATIENT'S MEMORY ADEQUATE TO SAFELY COMPLETE DAILY ACTIVITIES?: NO
GROOMING: NEEDS ASSISTANCE
FEEDING YOURSELF: NEEDS ASSISTANCE
HEARING - LEFT EAR: FUNCTIONAL
DRESSING YOURSELF: NEEDS ASSISTANCE
TOILETING: NEEDS ASSISTANCE
JUDGMENT_ADEQUATE_SAFELY_COMPLETE_DAILY_ACTIVITIES: NO
WALKS IN HOME: NEEDS ASSISTANCE
HEARING - RIGHT EAR: FUNCTIONAL
LACK_OF_TRANSPORTATION: NO

## 2024-11-25 ASSESSMENT — PAIN SCALES - GENERAL
PAINLEVEL_OUTOF10: 9
PAINLEVEL_OUTOF10: 6

## 2024-11-25 ASSESSMENT — PAIN DESCRIPTION - PAIN TYPE: TYPE: ACUTE PAIN

## 2024-11-25 ASSESSMENT — PAIN - FUNCTIONAL ASSESSMENT: PAIN_FUNCTIONAL_ASSESSMENT: 0-10

## 2024-11-25 NOTE — PROGRESS NOTES
Pharmacy Medication History Review    Henry Alexandre Jr. is a 79 y.o. male admitted for No Principal Problem: There is no principal problem currently on the Problem List. Please update the Problem List and refresh.. Pharmacy reviewed the patient's [id number] medications and allergies for accuracy.    The list below reflectives the updated PTA list. Please review each medication in order reconciliation for additional clarification and justification.  Prior to Admission medications    Medication Sig Start Date End Date Authorizing Provider   amLODIPine (Norvasc) 10 mg tablet Take 1 tablet (10 mg) by mouth once daily at bedtime.   Historical Provider, MD   ascorbic acid (Vitamin C) 1,000 mg tablet Take 1 tablet (1,000 mg) by mouth once daily at noon. Take with meals.   Historical Provider, MD   aspirin 81 mg chewable tablet Chew 1 tablet (81 mg) once daily in the morning.   Historical Provider, MD   atorvastatin (Lipitor) 80 mg tablet Take 1 tablet (80 mg) by mouth once daily at bedtime.   Historical Provider, MD   cetirizine (ZyrTEC) 10 mg tablet Take 1 tablet (10 mg) by mouth once daily in the morning.   Historical Provider, MD   cholecalciferol (Vitamin D-3) 25 MCG (1000 UT) tablet Take 1 tablet (1,000 Units) by mouth once daily at noon. Take with meals.   Historical Provider, MD   levothyroxine (Synthroid, Levoxyl) 75 mcg tablet Take 1 tablet (75 mcg) by mouth once daily in the morning. Take before meals.  Brand name   Historical Provider, MD   metoprolol succinate XL (Toprol-XL) 25 mg 24 hr tablet Take 1 tablet (25 mg) by mouth once daily in the evening. Take with meals.   Historical Provider, MD   omeprazole OTC (PriLOSEC OTC) 20 mg EC tablet Take 1 tablet (20 mg) by mouth 2 times a day. Do not crush, chew, or split.   Power Son MD   ramipril (Altace) 10 mg capsule Take 1 capsule (10 mg) by mouth once daily in the morning.   Historical Provider, MD   tamsulosin (Flomax) 0.4 mg 24 hr capsule Take 1 capsule  (0.4 mg) by mouth once daily at bedtime.   Historical Provider, MD   triamterene-hydrochlorothiazid (Dyazide) 37.5-25 mg capsule Take 1 capsule by mouth once daily in the morning.   Historical Provider, MD   Xhance 93 mcg/actuation aerosol breath activated 2 puffs 2 times a day.   Historical Provider, MD   Dupixent Pen 300 mg/2 mL pen injector Inject 2 mL (300 mg) under the skin every 14 (fourteen) days.   Historical Provider, MD        The list below reflectives the updated allergy list. Please review each documented allergy for additional clarification and justification.  Allergies  Reviewed by Oral Claros RN on 11/25/2024        Severity Reactions Comments    Pollen Extracts Medium Itching, Other Nasal congestion            Below are additional concerns with the patient's PTA list.      Devi Salter

## 2024-11-25 NOTE — ED PROCEDURE NOTE
Procedure  Critical Care    Performed by: RAYMUNDO Escobedo  Authorized by: Elyse H Klerman, MD    Critical care provider statement:     Critical care time (minutes):  35    Critical care time was exclusive of:  Separately billable procedures and treating other patients and teaching time    Critical care was necessary to treat or prevent imminent or life-threatening deterioration of the following conditions:  CNS failure or compromise    Critical care was time spent personally by me on the following activities:  Blood draw for specimens, ordering and performing treatments and interventions, development of treatment plan with patient or surrogate, ordering and review of laboratory studies, discussions with consultants, ordering and review of radiographic studies, discussions with primary provider, pulse oximetry, evaluation of patient's response to treatment, re-evaluation of patient's condition, examination of patient, review of old charts and obtaining history from patient or surrogate    Care discussed with: admitting provider                 RAYMUNDO Escobedo  11/25/24 1771

## 2024-11-25 NOTE — ED PROVIDER NOTES
HPI   Chief Complaint   Patient presents with    Stroke         History provided by:  Patient   used: No      79-year-old male with past medical history prior stroke presents the ED via EMS as a stroke activation.  Per EMS they were called as the patient is altered off of his baseline.  Last known well was last evening after his grandsons basketball game.  Per family bedside he had no residual after his last stroke, has suffered no trauma and is not on any anticoagulation.  States that he had a mild fever at home and a recent abdominal surgery approximately 5 weeks ago but has had no other complications since last evening.  Blood glucose normal via EMS and route.  He was taken directly to CT scan.     History and ROS are otherwise limited due to the patient's altered mental status      Patient History   Past Medical History:   Diagnosis Date    Acute pain of right shoulder 05/14/2024    Personal history of other endocrine, nutritional and metabolic disease 01/03/2023    History of hypothyroidism     Past Surgical History:   Procedure Laterality Date    CT ANGIO NECK  1/8/2023    CT NECK ANGIO W AND WO IV CONTRAST 1/8/2023 [facility]    CT HEAD ANGIO W AND WO IV CONTRAST  1/8/2023    CT HEAD ANGIO W AND WO IV CONTRAST 1/8/2023 [facility]    OTHER SURGICAL HISTORY  01/03/2023    Knee replacement    OTHER SURGICAL HISTORY  01/03/2023    Heart surgery    OTHER SURGICAL HISTORY  01/03/2023    Cataract surgery     No family history on file.  Social History     Tobacco Use    Smoking status: Never    Smokeless tobacco: Never   Vaping Use    Vaping status: Never Used   Substance Use Topics    Alcohol use: Never    Drug use: Never       Physical Exam   ED Triage Vitals [11/25/24 0944]   Temperature Heart Rate Respirations BP   37.3 °C (99.1 °F) 89 18 167/81      Pulse Ox Temp src Heart Rate Source Patient Position   (!) 93 % -- -- --      BP Location FiO2 (%)     -- --       Physical  Exam  VS: As documented in the triage note and EMR flowsheet from this visit were reviewed.    GEN: NAD, nontoxic, confused, chronically ill-appearing, resting comfortably in ED cart without difficulty or dyspnea  EYES:  EOMs grossly intact, anicteric sclera, no nystagmus noted, clear and equal bilaterally, no foreign body noted  HEENT: Airway patent  CARD: RRR, nontender chest, no crepitus deformities, no JVD, no murmurs rubs or gallops ; No edema noted.  Positive pulses bilaterally throughout.  Capillary refill less than 3 seconds.  No abnormal redness, warmth, tenderness or swelling noted to bilateral lower extremities.  PULMONARY: Diminished all lung fields. Moving air well, Nonlabored, no accessory muscle use, able to speak complete sentences  ABDOMEN: Abdomen soft, non-distended, no rebound, no guarding. Bowel sounds normal in all 4 quadrants.  Generalized tenderness to palpation.  No CVA tenderness.  No masses or organomegaly noted.  No evidence of peritonitis.   : deferred  MUSK: Spine appears normal, range of motion is not limited, no muscle or joint tenderness. Strength 5 out of 5 equal bilaterally throughout.  Generalized weakness noted.  No unilateral weakness noted. no step-offs, deformities or additional signs of trauma noted.  No spinal/midline tenderness to palpation  SKIN: Skin normal color for race, warm, dry and intact. No evidence of trauma. No rash noted.  NEURO: Alert and oriented x 1 to person, speech is clear, no obvious deficits noted. No facial droop noted.  Speech clear.  Mild dysarthria and expressive aphasia noted.  Initial NIH 6.  LYMPH: No adenopathy or splenomegaly. No cervical, supraclavicular or inguinal lymphadenopathy.    ED Course & MDM   ED Course as of 11/25/24 1542   Mon Nov 25, 2024   0917 Pt seen with PAT - 79yM prenotification for stroke BIBEMS for AMS at home.  LKN 10pm last night.  Pt febrile T100.8 with EMS and mildly aphasic (difficulty naming common objects) and  mildly inattentive (repeated instructions to get him to turn his head to specific provider amidst distractions of the ED, needed help positioning his arms for NIHSS) but no focal deficit, slurred speech, or facial droop [EK]   0939 EKG interpreted by me sinus rhythm with first-degree AV block  with new bundle no STEMI [EK]   0953 Labs reviewed, leukocytosis WBC 12.9, mild hypokalemia K+ 3.4 [EK]   1509 Repeat EKG still with nonspecific intraventricular conduction delay no STEMI [EK]   1528 Workup does not demonstrate infectious source for his presumed sepsis.  He is increasingly agitated, recently acute metabolic encephalopathy on top of his baseline dementia and now hypoxic as well.  No significant concern for PE.  No significant concern for meningitis.  Patient treated with empiric antibiotics, antipsychotics for his agitation and admitted for further care. [EK]      ED Course User Index  [EK] Elyse H Klerman, MD         Diagnoses as of 11/25/24 1542   Altered mental status, unspecified altered mental status type   Sepsis, due to unspecified organism, unspecified whether acute organ dysfunction present (Multi)                 No data recorded     [place] Coma Scale Score: 14 (11/25/24 0945 : Oral Claros RN)       NIH Stroke Scale: 6 (11/25/24 1115 : JAVED Escobedo-JENNIFER)                   Medical Decision Making    This is an 79-year-old male who presents ED for evaluation of altered mental status onset last evening, unknown exact time, stroke alert initiated.  Initial NIH 6 but unreliable.  He is pleasantly confused with expressive aphasia and mild dysarthria.  He has no unilateral weakness.  There is no evidence of trauma noted. He is out of the window for TNK. He is mildly febrile.  He is moving all extremities without difficulty.  Sepsis protocol was additionally initiated.  He was hypoxic on room air, does not wear oxygen at baseline, improved with 2 L nasal cannula.    Workup was  reviewed.  I did speak with Dr. Chaidez and neurology and will continue to monitor mental status. Did have a mild leukocytosis of 12.9 with a shift of 9.03.  His potassium was replaced.  He is already on antibiotics.  Imaging was reviewed and relatively unremarkable except for a possible pneumonia on his chest x-ray which could be causing his hypoxia and altered mental status.  He was given medications for his symptoms and 2 doses of medications for his agitation.  He did remain  altered throughout my ED course of care but airway is intact and remained otherwise hemodynamically stable.  Findings and plan were discussed with patient and family members at bedside agreeable for plan acknowledged understanding.  Discussed with medicine service that patient will likely need an MRI and/or LP when he is less agitated.- per family prior stroke was only seen on MRIs.  All questions and concerns were addressed.    EKG Interpretation: Sinus rhythm at a rate 80, , cures 158, QTc 465 without evidence of STEMI or ischemia, right bundle branch noted, ST depression throughout which is a change from prior    Differential Diagnoses Considered: Ischemic stroke, hemorrhagic stroke, sepsis, UTI, encephalopathy, electrolyte abnormality, dehydration, ACS, pneumonia, acute intra-abdominal process, meningitis, influenza, COVID, intoxication    Chronic Medical Conditions Significantly Affecting Care: Prior stroke    External Records Reviewed: I reviewed recent and relevant outside records including: PCP notes, prior discharge summary, previous radiologic studies, HIE    Independent Interpretation of Studies:  I independently interpreted: Chest x-ray which showed possible infiltrate on the left side.  CT brain showed no acute intracranial process    Escalation of Care:  Appropriate for admission to general medicine, Dr. Patel    Social Determinants of Health Significantly Affecting Care:  Lacking transportation    Prescription Drug  Consideration: N/A    Diagnostic testing considered: Considered a lumbar puncture and MRI but patient at this time is too agitated to safely and adequately obtain these, can be obtained inpatient    Discussion of Management with Other Providers:   I discussed the patient/results with: stroke team - Dr. Chaidez, admitting team      This patient was staffed with ED Attending Dr. Klerman to review the plan of care during ED course.    *Please note that portions of this note may have been completed with a voice recognition program.  Efforts were made to edit the dictations but occasionally, words are mis-transcribed.    Procedure  Procedures        Suad Felix, APRN-CNP  11/26/24 0208

## 2024-11-25 NOTE — H&P
Internal Medicine History and Physical   PATIENT NAME: Henry Alexandre Jr.  MRN: [id number]  DATE: 11/25/2024       Subjective   Henry Alexandre Jr. is a 79 y.o. male with a history of hiatal hernia, GERD, CVA (no residual deficits), vascular dementia, CAD status post CABG 2003, hypertension, hyperlipidemia, hypothyroidism, BPH, dysphagia who was brought to [facility] for worsening confusion and aphasia.  Per the patient's wife and daughter patient woke up this morning and something seemed off about him.  He has vascular dementia however per the wife he has a daily routine that he normally follows which was disrupted today.  She stated that he can normally work the TV remote by himself but was not able to figure it out on his own this morning.  Later in the morning patient's wife and daughter noticed that he was mixing up words and significantly more confused.  They were concerned that he may have had a stroke I decided to bring him to the emergency department as patient has had a stroke in the past.  Per the patient's wife he has then no any chest pain, shortness of breath, nausea or vomiting.  She stated that over the weekend he attended a basketball game, walked on a treadmill and has been going for daily walks with her.  He does not appear to be more dyspneic than usual.    ED course: Patient presented with a heart rate of 89, blood pressure 167/81.  Significant labs included a potassium of 3.4 WBC count 12.9.  No acute findings on brain imaging, chest x-ray or CT abdomen pelvis.  ECG showed  ST segment changes however troponins were flat and patient was asymptomatic. There was some possible constipation noted on CT abdomen pelvis blood cultures were obtained.Patient was given a dose of vancomycin, Zosyn, azithromycin, Tylenol, Haldol, Zyprexa, morphine, aspirin.  Patient was admitted to medicine for ischemic stroke rule out.    A 10 point ROS was completed and is negative unless mentioned above.    PMHx:  As above     PSHx:   Cataract surgery, CABG, knee replacement, hiatal hernia repair    Social Hx: Reviewed ; Social History: Tobacco - Never, Alcohol - none, Recreational Drugs - none    Fam Hx: family history is not on file.        Objective     General: Resting in bed, NAD  HEENT: Normocephalic, atraumatic, mucus membranes moist.  Eyes: Anicteric sclera  Neck:  Trachea midline.    Chest: Symmetric chest expansion, CTA bilaterally   CV:  Regular rate, regular rhythm.  Positive S1/S2.  Abdomen: soft, non-tender, non-distended, no guarding or peritoneal signs   : Villagomez catheter in place, draining appropriately  Extremities:  No lower extremity edema  Neurological: no obvious motor deficits.  5 out of 5 strength in all 4 extremities.  NIH 5 (+1 Level of consciousness, +2 for 0 questions answered, +1 performs one task, +1 mild to moderate aphasia.)   Psych: Flat affect  Skin:  Warm and dry       Medications     Scheduled medications  [Held by provider] amLODIPine, 10 mg, oral, Nightly  [START ON 11/26/2024] aspirin, 81 mg, oral, q AM  atorvastatin, 80 mg, oral, Nightly  [START ON 11/26/2024] cetirizine, 10 mg, oral, q AM  enoxaparin, 40 mg, subcutaneous, q24h  [START ON 11/26/2024] levothyroxine, 75 mcg, oral, Daily before breakfast  [Held by provider] lisinopril, 20 mg, oral, Daily  metoprolol succinate XL, 25 mg, oral, Daily with evening meal  pantoprazole, 40 mg, oral, BID AC  perflutren lipid microspheres, 0.5-10 mL of dilution, intravenous, Once in imaging  perflutren protein A microsphere, 0.5 mL, intravenous, Once in imaging  sulfur hexafluoride microsphr, 2 mL, intravenous, Once in imaging  [Held by provider] tamsulosin, 0.4 mg, oral, Nightly  [Held by provider] triamterene-hydrochlorothiazid, 1 tablet, oral, Daily      Continuous medications     PRN medications  PRN medications: hydrALAZINE **FOLLOWED BY** [START ON 11/27/2024] hydrALAZINE, labetaloL        Assessment / Plan   Henry Alexandre Jr. is a 79 y.o.  male with a history of hiatal hernia s/p repair in October, GERD, CVA (no residual deficits), vascular dementia, CAD status post CABG 2003, hypertension, hyperlipidemia, hypothyroidism, BPH, dysphagia who was brought to [facility] for worsening confusion and aphasia.    Acute:  #Concern for ischemic stroke  #Aphasia  -CT brain attack negative, at this time we will obtain MRI/MRA of head and neck.  We will consult neurology.  We will also workup for stroke risk factors including lipid panel, A1c.  Patient was already taking aspirin we will continue this, start plavix.  We will hold all antihypertensives for permissive hypertension.  We will obtain an echo with bubble study to rule out PFO.  We will continue patient's Lipitor 80 mg.  Lovenox for DVT prophylaxis.  N.p.o. pending swallow eval by SLP.     #Acute urinary retention  -Patient was acutely retaining significant amount of urine in the emergency department, a Villagomez catheter was inserted.  It is draining appropriately.  We will reassess and perform a voiding trial in the future.    #Hypokalemia  -Patient had a hiatal hernia surgery and recently had his diet advanced to soft foods.  He was previously on liquids.  There have been no reports of diarrhea or vomiting.  I believe that this is in the setting of poor oral intake.  We will replete it as needed and continue to monitor his electrolytes.    #Leukocytosis, suspect reactive   -Patient has a slightly elevated white count with some reports of subjective chills at home.  There has been no fever noted.  No other objective signs of infection.  This leukocytosis could be reactive.  Blood cultures were obtained in the emergency department and he was given a dose of vancomycin, azithromycin and Zosyn.  At this time we will monitor his white count closely.    Chronic:  Hiatal hernia-recently surgically repaired, patient has been eating a soft diet at home with low acidity foods  GERD-continue pantoprazole  40 twice daily  CVA (no residual deficits)  CAD status post CABG 2003-continue aspirin  Hypertension-holding antihypertensive medications in the setting of hypertension  Hyperlipidemia-continue atorvastatin 80 mg  Hypothyroidism-continue levothyroxine  BPH-continue Flomax  Dysphagia-n.p.o. pending SLP eval    Diet: N.p.o. pending swallow eval  DVT Prophylaxis: Lovenox  Consults: Neurology  Code Status: full code    This is a preliminary note written by the resident. Please wait for attending addendum for finalization of note and recommendations.    Dr. Fracisco Patel  Internal Medicine

## 2024-11-25 NOTE — PROGRESS NOTES
Spiritual Care Visit    Clinical Encounter Type  Visited With: Patient not available    Patient Spiritual Care Encounters  Social Interaction: Unable to assess      [facility]  Notes  ***    Patient: Henry Alexandre Jr.    Date: 11/25/2024  Time: 10:49 AM  Total time (min.):***  This visit showed care and concern and offered the opportunity for emotional, spiritual care if needed. I allowed time and space for spiritual support as part of a holistic approach to wellness that addressed the whole person. *** was offered the opportunity for a visit for emotional, spiritual support. I was unable to assess at this time. This was a holistic approach that addressed wellness as whole-body care the allows for the integration of the body, mind and spirit. I followed the policy for PPE. There are no other needs.    I am available upon request.  [facility] Department of Spiritual Care Contact #: [phone]  Signed by: Rev. Ronel Nam ThM, MA

## 2024-11-25 NOTE — NURSING NOTE
Admission complete. Patient is A&O to self only. Wife and daughter at bedside to assist with admission. Patient is currently on a restricted diet d/t recent hiatal hernia surgery.

## 2024-11-26 ENCOUNTER — APPOINTMENT (OUTPATIENT)
Dept: CARDIOLOGY | Facility: HOSPITAL | Age: 79
End: 2024-11-26
Payer: MEDICARE

## 2024-11-26 LAB
ANION GAP SERPL CALC-SCNC: 11 MMOL/L (ref 10–20)
BASOPHILS # BLD AUTO: 0.05 X10*3/UL (ref 0–0.1)
BASOPHILS NFR BLD AUTO: 0.5 %
BUN SERPL-MCNC: 13 MG/DL (ref 6–23)
CALCIUM SERPL-MCNC: 9.3 MG/DL (ref 8.6–10.3)
CHLORIDE SERPL-SCNC: 98 MMOL/L (ref 98–107)
CO2 SERPL-SCNC: 31 MMOL/L (ref 21–32)
CREAT SERPL-MCNC: 0.79 MG/DL (ref 0.5–1.3)
EGFRCR SERPLBLD CKD-EPI 2021: 89 ML/MIN/1.73M*2
EOSINOPHIL # BLD AUTO: 0.26 X10*3/UL (ref 0–0.4)
EOSINOPHIL NFR BLD AUTO: 2.8 %
ERYTHROCYTE [DISTWIDTH] IN BLOOD BY AUTOMATED COUNT: 12.7 % (ref 11.5–14.5)
EST. AVERAGE GLUCOSE BLD GHB EST-MCNC: 123 MG/DL
GLUCOSE BLD MANUAL STRIP-MCNC: 101 MG/DL (ref 74–99)
GLUCOSE BLD MANUAL STRIP-MCNC: 105 MG/DL (ref 74–99)
GLUCOSE BLD MANUAL STRIP-MCNC: 139 MG/DL (ref 74–99)
GLUCOSE BLD MANUAL STRIP-MCNC: 84 MG/DL (ref 74–99)
GLUCOSE SERPL-MCNC: 91 MG/DL (ref 74–99)
HBA1C MFR BLD: 5.9 %
HCT VFR BLD AUTO: 42.3 % (ref 41–52)
HGB BLD-MCNC: 14.8 G/DL (ref 13.5–17.5)
IMM GRANULOCYTES # BLD AUTO: 0.03 X10*3/UL (ref 0–0.5)
IMM GRANULOCYTES NFR BLD AUTO: 0.3 % (ref 0–0.9)
LYMPHOCYTES # BLD AUTO: 2.9 X10*3/UL (ref 0.8–3)
LYMPHOCYTES NFR BLD AUTO: 30.7 %
MAGNESIUM SERPL-MCNC: 1.7 MG/DL (ref 1.6–2.4)
MCH RBC QN AUTO: 33.5 PG (ref 26–34)
MCHC RBC AUTO-ENTMCNC: 35 G/DL (ref 32–36)
MCV RBC AUTO: 96 FL (ref 80–100)
MONOCYTES # BLD AUTO: 0.95 X10*3/UL (ref 0.05–0.8)
MONOCYTES NFR BLD AUTO: 10.1 %
NEUTROPHILS # BLD AUTO: 5.25 X10*3/UL (ref 1.6–5.5)
NEUTROPHILS NFR BLD AUTO: 55.6 %
NRBC BLD-RTO: 0 /100 WBCS (ref 0–0)
PLATELET # BLD AUTO: 253 X10*3/UL (ref 150–450)
POTASSIUM SERPL-SCNC: 3.3 MMOL/L (ref 3.5–5.3)
PROCALCITONIN SERPL-MCNC: 0.03 NG/ML
RBC # BLD AUTO: 4.42 X10*6/UL (ref 4.5–5.9)
SODIUM SERPL-SCNC: 137 MMOL/L (ref 136–145)
WBC # BLD AUTO: 9.4 X10*3/UL (ref 4.4–11.3)

## 2024-11-26 PROCEDURE — 1200000002 HC GENERAL ROOM WITH TELEMETRY DAILY

## 2024-11-26 PROCEDURE — 97161 PT EVAL LOW COMPLEX 20 MIN: CPT | Mod: GP

## 2024-11-26 PROCEDURE — 82947 ASSAY GLUCOSE BLOOD QUANT: CPT

## 2024-11-26 PROCEDURE — 36415 COLL VENOUS BLD VENIPUNCTURE: CPT | Performed by: INTERNAL MEDICINE

## 2024-11-26 PROCEDURE — 97165 OT EVAL LOW COMPLEX 30 MIN: CPT | Mod: GO

## 2024-11-26 PROCEDURE — 2500000004 HC RX 250 GENERAL PHARMACY W/ HCPCS (ALT 636 FOR OP/ED): Performed by: INTERNAL MEDICINE

## 2024-11-26 PROCEDURE — 93005 ELECTROCARDIOGRAM TRACING: CPT

## 2024-11-26 PROCEDURE — 92610 EVALUATE SWALLOWING FUNCTION: CPT | Mod: GN

## 2024-11-26 PROCEDURE — 2500000002 HC RX 250 W HCPCS SELF ADMINISTERED DRUGS (ALT 637 FOR MEDICARE OP, ALT 636 FOR OP/ED)

## 2024-11-26 PROCEDURE — 99233 SBSQ HOSP IP/OBS HIGH 50: CPT

## 2024-11-26 PROCEDURE — 80048 BASIC METABOLIC PNL TOTAL CA: CPT | Performed by: INTERNAL MEDICINE

## 2024-11-26 PROCEDURE — 94760 N-INVAS EAR/PLS OXIMETRY 1: CPT

## 2024-11-26 PROCEDURE — 85025 COMPLETE CBC W/AUTO DIFF WBC: CPT | Performed by: INTERNAL MEDICINE

## 2024-11-26 PROCEDURE — 2500000001 HC RX 250 WO HCPCS SELF ADMINISTERED DRUGS (ALT 637 FOR MEDICARE OP)

## 2024-11-26 PROCEDURE — 2500000004 HC RX 250 GENERAL PHARMACY W/ HCPCS (ALT 636 FOR OP/ED)

## 2024-11-26 PROCEDURE — 83735 ASSAY OF MAGNESIUM: CPT

## 2024-11-26 PROCEDURE — 99223 1ST HOSP IP/OBS HIGH 75: CPT | Performed by: PSYCHIATRY & NEUROLOGY

## 2024-11-26 PROCEDURE — 93306 TTE W/DOPPLER COMPLETE: CPT

## 2024-11-26 RX ORDER — POLYETHYLENE GLYCOL 3350 17 G/17G
17 POWDER, FOR SOLUTION ORAL DAILY PRN
Status: DISCONTINUED | OUTPATIENT
Start: 2024-11-26 | End: 2024-11-27 | Stop reason: HOSPADM

## 2024-11-26 ASSESSMENT — COGNITIVE AND FUNCTIONAL STATUS - GENERAL
HELP NEEDED FOR BATHING: A LITTLE
WALKING IN HOSPITAL ROOM: A LITTLE
PERSONAL GROOMING: A LITTLE
MOVING TO AND FROM BED TO CHAIR: A LITTLE
HELP NEEDED FOR BATHING: A LOT
TURNING FROM BACK TO SIDE WHILE IN FLAT BAD: A LITTLE
TOILETING: A LOT
HELP NEEDED FOR BATHING: A LITTLE
WALKING IN HOSPITAL ROOM: A LITTLE
MOVING TO AND FROM BED TO CHAIR: A LITTLE
CLIMB 3 TO 5 STEPS WITH RAILING: A LITTLE
DRESSING REGULAR UPPER BODY CLOTHING: A LITTLE
MOBILITY SCORE: 18
MOVING TO AND FROM BED TO CHAIR: A LITTLE
DAILY ACTIVITIY SCORE: 12
DAILY ACTIVITIY SCORE: 17
DRESSING REGULAR UPPER BODY CLOTHING: A LITTLE
DRESSING REGULAR LOWER BODY CLOTHING: A LITTLE
DRESSING REGULAR LOWER BODY CLOTHING: A LITTLE
CLIMB 3 TO 5 STEPS WITH RAILING: A LITTLE
STANDING UP FROM CHAIR USING ARMS: A LITTLE
TURNING FROM BACK TO SIDE WHILE IN FLAT BAD: A LITTLE
EATING MEALS: A LOT
TOILETING: TOTAL
WALKING IN HOSPITAL ROOM: A LOT
DAILY ACTIVITIY SCORE: 19
DRESSING REGULAR UPPER BODY CLOTHING: A LOT
STANDING UP FROM CHAIR USING ARMS: A LITTLE
CLIMB 3 TO 5 STEPS WITH RAILING: A LOT
PERSONAL GROOMING: A LOT
DRESSING REGULAR LOWER BODY CLOTHING: A LOT
MOVING FROM LYING ON BACK TO SITTING ON SIDE OF FLAT BED WITH BEDRAILS: A LITTLE
MOBILITY SCORE: 16
MOVING FROM LYING ON BACK TO SITTING ON SIDE OF FLAT BED WITH BEDRAILS: A LITTLE
STANDING UP FROM CHAIR USING ARMS: A LITTLE
TOILETING: A LITTLE
PERSONAL GROOMING: A LITTLE
TURNING FROM BACK TO SIDE WHILE IN FLAT BAD: A LITTLE
MOBILITY SCORE: 19

## 2024-11-26 ASSESSMENT — PAIN - FUNCTIONAL ASSESSMENT
PAIN_FUNCTIONAL_ASSESSMENT: 0-10

## 2024-11-26 ASSESSMENT — PAIN SCALES - GENERAL
PAINLEVEL_OUTOF10: 0 - NO PAIN

## 2024-11-26 ASSESSMENT — PAIN SCALES - PAIN ASSESSMENT IN ADVANCED DEMENTIA (PAINAD)
BREATHING: NORMAL
BREATHING: SMILING OR INEXPRESSIVE
CONSOLABILITY: NO NEED TO CONSOLE
TOTALSCORE: 0
TOTALSCORE: NO NEED TO CONSOLE
TOTALSCORE: 0
FACIALEXPRESSION: SMILING OR INEXPRESSIVE
BREATHING: NORMAL
BODYLANGUAGE: RELAXED
BODYLANGUAGE: RELAXED

## 2024-11-26 NOTE — PROGRESS NOTES
Speech-Language Pathology    SLP Adult Inpatient Speech-Language Pathology Clinical Swallow Evaluation    Patient Name: Henry Alexandre Jr.  MRN: [id number]  Today's Date: 11/26/2024   Time Calculation  Start Time: 0920  Stop Time: 0940  Time Calculation (min): 20 min         Current Problem:   1. Altered mental status, unspecified altered mental status type        2. Sepsis, due to unspecified organism, unspecified whether acute organ dysfunction present (Multi)        3. Coronary artery disease involving native coronary artery of native heart without angina pectoris  Transthoracic Echo (TTE) Complete    Transthoracic Echo (TTE) Complete            Recommendations: GI precautions restricted diet until 12/12/24 per wife Kyra who is the pt's caregiver d/t recent hiatal hernia surgery.   Solid Diet Recommendations : Soft & bite sized/chopped (IDDSI Level 6)  Liquid Diet Recommendations: Thin (IDDSI Level 0)  Compensatory Swallowing Strategies: No straws  Medication Administration Recommendations: Whole, With Pureed      Assessment: Functional oropharyngeal swallow function skills.   Prognosis: Good, pt with excellent support -caregivers. ( Wife/dtr)     -Intact oral phase of the swallow, and suspected functional pharyngeal swallow given clinical bedside indicators.   Pt is managing secretions, tongue protrudes to midline, no noticeable facial droop.  -PO trials , tsp sips , cup sips, multiple cup sips, 3 oz water challenge via cup sips , purees, and soft solids,   -ORAL PHASE: WFL   labial seal intact on cup and tsp. No straws permitted until after 12/12/24 d/t pt s/p recent Hiatal Hernia sx at [facility] on 10/22/24.   There is no anterior loss of the oral bolus.  Mastication on adequate/natural dentition is WFL across soft textured PO with the oral bolus formation and manipulation adequate.   No oral pocketing. No significant oral residue after the swallow.     -PHARYNGEAL PHASE; appears WFL    No overt s/s aspiration with  all consistencies. No change in vocal quality or respirations with PO intake. SpO2 remains 96%+ with PO intake and in the basic conversational exchange.   Will suggest pt continue an oral diet per GI precautions Hiatal hernia post sx recommendations  of a Soft diet at this time with thins. Pt is not permitted caffeine  , carbonated drinks, citrus or crunchy items .     Plan:  Inpatient/Swing Bed or Outpatient: Inpatient  Treatment/Interventions: Assess diet tolerance, Patient/family education  SLP Plan: Skilled SLP  SLP Frequency: 1x per week  Discussed POC: Caregiver/family (Wife Kyra)  Discussed Risks/Benefits: Yes  Patient/Caregiver Agreeable: Yes    Goals ( Established 11/26/24)  Pt will swallow GI restrictive soft /thin liquid diet without e/o dysphagia.   Determine in house S/L evaluation timing .     H & P :   79 y.o. male with a history of hiatal hernia, GERD, CVA (no residual deficits), vascular dementia, CAD status post CABG 2003, hypertension, hyperlipidemia, hypothyroidism, BPH, ? dysphagia who was brought to [facility] for worsening confusion and aphasia.  Per the patient's wife and daughter patient woke up this morning and something seemed off about him.  He has vascular dementia however per the wife he has a daily routine that he normally follows which was disrupted today.  She stated that he can normally work the TV remote by himself but was not able to figure it out on his own this morning.  Later in the morning patient's wife and daughter noticed that he was mixing up words and significantly more confused.  They were concerned that he may have had a stroke I decided to bring him to the emergency department as patient has had a stroke in the past.   Subjective   Current Problem:  New Left temporal CVA with word finding  that is improved this date. SLP to follow and assess.       General Visit Information:  Patient Class: Inpatient  Living Environment: Live with __ (wife  Kermit  [employer] Diet: GI Soft until 12/12/24  Current Diet : GI Soft /thins  Dysphagia Diagnosis: Within functional limits      H & P :   79 y.o. male with a history of hiatal hernia, GERD, CVA (no residual deficits), vascular dementia, CAD status post CABG 2003, hypertension, hyperlipidemia, hypothyroidism, BPH, remote  dysphagia that resolved  who was brought to [facility] for worsening confusion and ? aphasia.    Per the patient's wife and daughter patient woke up this morning and something seemed off about him.  He has vascular dementia however per the wife he has a daily routine that he normally follows which was disrupted today.  She stated that he can normally work the TV remote by himself but was not able to figure it out on his own this morning.  Later in the morning patient's wife and daughter noticed that he was mixing up words and significantly more confused.  They were concerned that he may have had a stroke I decided to bring him to the emergency department as patient has had a stroke in the past.       Vital Signs:     Stable Respiratory skills on 2 L    Objective           MRI BRAIN completed 11/25/24 :  1. Single subcentimeter acute ischemic infarct in the subcortical  white matter of the left temporal lobe.  2. Moderate burden of supratentorial chronic small vessel ischemic  disease.      MRA HEAD AND NECK:  1. No evidence of major vessel occlusion or significant stenosis on  MRA head.  2. 50% stenosis of the proximal postbulbar left ICA. Otherwise, no  hemodynamically significant stenosis in the neck.  3. Severely narrowed left intracranial vertebral artery, unchanged  from prior study.  4. Moderate periventricular and subcortical hemispheric white matter  hypodensities are most compatible with chronic small vessel ischemic  disease.Chronic lacunar infarct in the left caudal thalamic groove  noted.      CXR completed 11/25/24   IMPRESSION:  Patchy bibasilar atelectasis and/or  infiltrate slightly more marked  on the left. Postsurgical findings as above.    Baseline Assessment:  Respiratory Status: Oxygen via nasal cannula (2 L)  Behavior/Cognition: Alert, Cooperative, Pleasant mood, Confused  Patient Positioning: Upright in Bed  Baseline Vocal Quality: Normal  Volitional Cough: Strong  Volitional Swallow: Within Functional Limits      Pain:  Pain Assessment  Pain Assessment: 0-10  0-10 (Numeric) Pain Score: 0 - No pain  PAINAD (Pain Assessment in Advanced Dementia)  Breathing: Normal  Negative Vocalization: None  Facial Expression: Smiling or inexpressive  Body Language: Relaxed  Consolability: No need to console  PAINAD Score: 0    Oral/Motor Assessment:  Dentition: Adequate/Natural  Facial Sensation: Within Functional Limits  Facial Symmetry: Within Functional Limits  Labial Strength: Within Functional Limits  Lingual Symmetry: Within Functional Limits  Vocal Quality: Within Functional Limits      Consistencies Trialed:  Consistencies Trialed: Yes  Consistencies Trialed: Thin (IDDSI Level 0) - Cup, Pureed/extremely thick (IDDSI Level 4), Soft & bite sized/chopped (IDDSI Level 6)    Self fed with good tolerance of the current GI Soft oral diet and thins .   Clinical Observations:  Patient Positioning: Upright in Bed  Management of Oral Secretions: Adequate  Was The 3 oz Swallow Protocol Completed: Yes  Inpatient:    SLP has provided pt and caregiver Wife Kyra /RN [place]  with the results and recommendations of this session.      Consultations/Referrals/Coordination of Services: OP ST for mentation changes

## 2024-11-26 NOTE — PROGRESS NOTES
Speech-Language Pathology                 Therapy Communication Note    Patient Name: Henry Alexandre Jr.  MRN: [id number]  Department: [facility]  Room: [address] [place], [place] [address]  Today's Date: 11/26/2024     Discipline: Speech Language Pathology    Missed Visit Reason:  Speech Language Evaluation attempted per family wishes as pt s/p a new Left Temporal CVA , however pt asleep this afternoon with pt too tired to complete. Unable to maintain eye opening for the evaluation.     Missed Time: Attempt    Comment: h/o a remote CVA and Dementia baseline with poor memory.         MRI BRAIN results completed 11/25/24.   1. Single subcentimeter acute ischemic infarct in the subcortical  white matter of the left temporal lobe.  2. Moderate burden of supratentorial chronic small vessel ischemic  disease.  3. Moderate periventricular and subcortical hemispheric white matter  hypodensities are most compatible with chronic small vessel ischemic  disease.Chronic lacunar infarct in the left caudal thalamic groove  noted.

## 2024-11-26 NOTE — PROGRESS NOTES
"Internal Medicine Progress Note    PATIENT NAME: Henry Alexandre Jr.  MRN: [id number]  DATE: 11/26/2024       Subjective   Patient seen examined at bedside this morning, he is more awake/alert today.  Wife is at bedside, we have updated her on the plan.       Objective   /80   Pulse 59   Temp 36.6 °C (97.9 °F)   Resp 18   Ht 1.753 m (5' 9\")   Wt 79.4 kg (175 lb)   SpO2 94%   BMI 25.84 kg/m²     General: Pleasant and cooperative  HEENT: Normocephalic, atraumatic, mucus membranes moist.  Eyes: EOMI  Neck:  Trachea midline.    Chest: Symmetric chest expansion, CTA bilaterally   CV:  Regular rate, regular rhythm.  Positive S1/S2.  : Urinary catheter in place, draining appropriately  Abdomen: soft, non-tender, non-distended, no guarding or peritoneal signs   Extremities:  No lower extremity edema  Neurological: ANO x 3  Psych: appropriate affect and behavior   Skin:  Warm and dry       Medications     Scheduled medications  [Held by provider] amLODIPine, 10 mg, oral, Nightly  aspirin, 81 mg, oral, q AM  atorvastatin, 80 mg, oral, Nightly  cetirizine, 10 mg, oral, q AM  clopidogrel, 75 mg, oral, Daily  enoxaparin, 40 mg, subcutaneous, q24h  levothyroxine, 75 mcg, oral, Daily before breakfast  [Held by provider] lisinopril, 20 mg, oral, Daily  [Held by provider] metoprolol succinate XL, 25 mg, oral, Daily with evening meal  [Held by provider] pantoprazole, 40 mg, oral, BID AC  pantoprazole, 40 mg, intravenous, Daily  perflutren lipid microspheres, 0.5-10 mL of dilution, intravenous, Once in imaging  perflutren protein A microsphere, 0.5 mL, intravenous, Once in imaging  sulfur hexafluoride microsphr, 2 mL, intravenous, Once in imaging  tamsulosin, 0.4 mg, oral, Nightly  [Held by provider] triamterene-hydrochlorothiazid, 1 tablet, oral, Daily      Continuous medications     PRN medications  PRN medications: acetaminophen **OR** acetaminophen **OR** acetaminophen, hydrALAZINE **FOLLOWED BY** [START ON 11/27/2024] " hydrALAZINE, labetaloL, OLANZapine, polyethylene glycol        Assessment / Plan   Henry Alexandre Jr. is a 79 y.o. male with a history of hiatal hernia s/p repair in October, GERD, CVA (no residual deficits), vascular dementia, CAD status post CABG 2003, hypertension, hyperlipidemia, hypothyroidism, BPH, dysphagia who was brought to [facility] for worsening confusion and aphasia.     Acute:  # Left temporal artery infarct  #Left ICA stenosis  -Neurology has been consulted, recommending discontinuation of permissive hypertension as it has been greater than 24 hours.  Echocardiogram with bubble study pending.  Continue aspirin, Plavix, statin, will need 2-week heart monitor on discharge.     #Acute urinary retention  -Patient was acutely retaining significant amount of urine in the emergency department, a Villagomez catheter was inserted.  It is draining appropriately.  Voiding trial today.     #Hypokalemia  -Patient had a hiatal hernia surgery and recently had his diet advanced to soft foods.  He was previously on liquids.  There have been no reports of diarrhea or vomiting.  I believe that this is in the setting of poor oral intake.  We will replete it as needed and continue to monitor his electrolytes.     #Leukocytosis, suspect reactive   -Patient has a slightly elevated white count with some reports of subjective chills at home.  There has been no fever noted.  No other objective signs of infection.  This leukocytosis could be reactive.  Blood cultures were obtained in the emergency department and he was given a dose of vancomycin, azithromycin and Zosyn.  At this time we will monitor his white count closely.     Chronic:  Hiatal hernia-recently surgically repaired, patient has been eating a soft diet at home with low acidity foods  GERD-continue pantoprazole 40 twice daily  CVA (no residual deficits)  CAD status post CABG 2003-continue aspirin  Hypertension-holding antihypertensive medications in the  setting of hypertension  Hyperlipidemia-continue atorvastatin 80 mg  Hypothyroidism-continue levothyroxine  BPH-continue Flomax  Dysphagia-n.p.o. pending SLP eval     Diet: N.p.o. pending swallow eval  DVT Prophylaxis: Lovenox  Consults: Neurology  Code Status: full code     This is a preliminary note written by the resident. Please wait for attending addendum for finalization of note and recommendations.     Dr. Fracisco Patel  Internal Medicine

## 2024-11-26 NOTE — PROGRESS NOTES
Occupational Therapy    Evaluation    Patient Name: Henry Alexandre Jr.  MRN: [id number]  Today's Date: 11/26/2024  Time Calculation  Start Time: 1350  Stop Time: 1403  Time Calculation (min): 13 min    Assessment  IP OT Assessment  OT Assessment: Patient requires assist for balance/safety during ADLs & functional transfers secondary to impaired dynamic stand balance and impaired safety awareness;  patient would benefit from 24 hour support with O.T. services at a low intensity to improve independence with ADLs, transfers & mobility.  Prognosis: Good  End of Session Communication: Bedside nurse  End of Session Patient Position: Up in chair, Alarm on (call light in reach)    Plan:  Treatment Interventions: ADL retraining, Functional transfer training, Endurance training, Neuromuscular reeducation, Compensatory technique education  OT Frequency: 3 times per week  OT Discharge Recommendations: Low intensity level of continued care, 24 hr supervision due to cognition  OT - OK to Discharge: Yes (from an O.T. standpoint)    Subjective     Current Problem:  Patient Active Problem List   Diagnosis    CAD (coronary artery disease), native coronary artery    Coronary artery disease involving coronary bypass graft of native heart without angina pectoris    Cerebrovascular accident (CVA) due to bilateral stenosis of posterior cerebral arteries (Multi)    Cognitive communication deficit    Dysphagia    Primary hypertension    Aortic valve sclerosis    Left bundle branch block (LBBB)    Cognitive impairment    Colon polyp    Complex tear of medial meniscus, current injury, right knee, initial encounter    Diverticulosis    Fatigue    Hyperlipidemia    Hypertrophy of tongue papillae    Localized, primary osteoarthritis    Mild vascular dementia    Osteoarthritis    Presence of right artificial knee joint    Shoulder weakness    Spondylolisthesis of lumbosacral region    Large hiatal hernia    Hiatal hernia    Altered mental status,  unspecified altered mental status type       General:  General  Reason for Referral: OT eval & treat (altered mental status, concern for ischemic stroke, aphasia, urinary retention, hypokalemia)  Referred By: Fracisco Patel DO  Past Medical History Relevant to Rehab: 11/25/24: change in mental status, confusion, aphasia.  MRI showed acute ischemic infarct left temporal lobe   PMH: hiatal hernia, GERD, CVA, vascular dementia, CAD, CABG, HTN, HLD, hypothyroidism, BPH, dysphagia  Co-Treatment: PT  Co-Treatment Reason: to maximize patient safety & mobility  Prior to Session Communication: Bedside nurse  Patient Position Received: Bed, 2 rail up, Alarm on  General Comment: Per conference with RN, patient is medically stable for therapy eval    Precautions:  Precautions Comment: fall/safety, bed alarm, posey alarm in chair,         Pain:  Pain Assessment  Pain Assessment: 0-10  0-10 (Numeric) Pain Score: 0 - No pain    Objective     Cognition:  Overall Cognitive Status:  (A & O x 2-3, stated 2025 for year; follows directions with repetition and mod cues for safety. Flat affect)             Home Living:  Home Living Comments: Patient lives with wife in 2 story home, 1/2 bath 1st floor, bed/bath 2nd floor with rails on stairs. Independent ADLs, transfers & mobility without device; does not drive, wife manages IADLs. Has tub/shower combo, standard height toilet       ADL:  Grooming Assistance: Stand by  UE Dressing Assistance: Stand by  LE Dressing Assistance: Minimal  Toileting Assistance with Device: Total  Toileting Deficit:  (young catheter)    Activity Tolerance:  Endurance: Decreased tolerance for upright activites (slight SOB noted with moderate activity)    Bed Mobility/Transfers:   Bed Mobility  Bed Mobility:  (supervision supine to sit)  Transfers  Transfer:  (SBA sit to stand from edge of bed)    Ambulation/Gait Training:  Functional Mobility  Functional Mobility Performed:  (CGA without device,  unsteady)    Sitting Balance:  Dynamic Sitting Balance  Dynamic Sitting-Level of Assistance: Close supervision    Standing Balance:  Dynamic Standing Balance  Dynamic Standing-Balance Support: No upper extremity supported  Dynamic Standing-Level of Assistance: Contact guard    Vision: Vision - Basic Assessment  Current Vision:  (grossly WFL)   and      Sensation:  Light Touch: No apparent deficits    Strength:  Strength Comments: BUE WFL    Perception:       Coordination:  Movements are Fluid and Coordinated: Yes (with cues for direction following)       Outcome Measures: [facility] Daily Activity  Putting on and taking off regular lower body clothing: A little  Bathing (including washing, rinsing, drying): A little  Putting on and taking off regular upper body clothing: A little  Toileting, which includes using toilet, bedpan or urinal: Total  Taking care of personal grooming such as brushing teeth: A little  Eating Meals: None  Daily Activity - Total Score: 17                    EDUCATION:     Education Documentation  ADL Training, taught by Jonna Be OT at 11/26/2024  2:26 PM.  Learner: Patient  Readiness: Acceptance  Method: Explanation  Response: Verbalizes Understanding    Education Comments  No comments found.        Goals:   Encounter Problems       Encounter Problems (Active)       OT Goals       Increase functional transfers to/from bed, chair & commode to supervision with DME for safety  (Progressing)       Start:  11/26/24    Expected End:  12/10/24            Increase LE dressing & bathing to supervision with adaptive equipment as needed.  (Progressing)       Start:  11/26/24    Expected End:  12/10/24            Increase dynamic stand balance to supervision with UE support to promote increased independence with ADL & functional transfers  (Progressing)       Start:  11/26/24    Expected End:  12/10/24            Demonstrate compliance to energy conservation techs and safety techs during ADLs    (Progressing)       Start:  11/26/24    Expected End:  12/10/24

## 2024-11-26 NOTE — CARE PLAN
The patient's goals for the shift include      The clinical goals for the shift include pt will remain hemodynamically stable throught shift      Problem: Safety - Adult  Goal: Free from fall injury  Flowsheets (Taken 11/26/2024 0640)  Free from fall injury: Instruct family/caregiver on patient safety     Problem: Pain - Adult  Goal: Verbalizes/displays adequate comfort level or baseline comfort level  Flowsheets (Taken 11/26/2024 0640)  Verbalizes/displays adequate comfort level or baseline comfort level:   Encourage patient to monitor pain and request assistance   Assess pain using appropriate pain scale   Administer analgesics based on type and severity of pain and evaluate response

## 2024-11-26 NOTE — CARE PLAN
The patient's goals for the shift include      The clinical goals for the shift include pt will remain hemodynamically stable throught shift      Problem: General Stroke  Goal: Maintain BP within ordered limits throughout shift  Outcome: Progressing     Problem: General Stroke  Goal: No symptoms of aspiration throughout shift  Outcome: Progressing

## 2024-11-26 NOTE — CONSULTS
Inpatient consult to Neurology  Consult performed by: Imani Chaidez MD  Consult ordered by: Elyse H Klerman, MD        History Of Present Illness  Henry Alexandre Jr. is a 79 y.o. male with past medical history of stroke, vascular dementia, CAD, CABG, hyperlipidemia, dysphagia, hypertension now presenting with worsening confusion.  Patient was at home with wife.  He was having trouble working his TV remote.  He was mixing up his words and seemed more confused than his usual.  He typically goes on daily walks.  His wife is at bedside to provide independent history and confirms that he is much better today.  He has no complaints.  There is reported that he was given Vanco, Zosyn and azithromycin as well as Haldol Zyprexa morphine and aspirin.  He was last known well 10 PM day prior was out of window for TNK.  He was noted to be agitated in the ER he started on dual antiplatelet therapy as well as statin.  No infectious source has been found/ identified.   Past medical history as above also including hiatal hernia, GERD, BPH.    Past surgical history cataract surgery, knee replacement, hiatal hernia repair, CABG  Social history: .  Non-smoker.  No alcohol use.    Family history: No dementia or stroke in the family.  His father had heart disease.    I was able to personally review labs as well as brain imaging.  Patient had low potassium 3.3.  TSH normal.  A1c is normal.  LDL 58.  No signs of anemia.  Urine toxicology screen was normal .  Patient underwent MRI brain as well as MRA head and neck.  There is confirmation of the left temporal lobe infarct with restricted ADC diffusion.  He has 50% stenosis of the left ICA.  He has moderate to severe chronic vascular changes.  His left vertebral artery severely stenotic.  Allergies  Pollen extracts  Medications Prior to Admission   Medication Sig Dispense Refill Last Dose/Taking    amLODIPine (Norvasc) 10 mg tablet Take 1 tablet (10 mg) by mouth once daily at  bedtime.   11/24/2024 Bedtime    ascorbic acid (Vitamin C) 1,000 mg tablet Take 1 tablet (1,000 mg) by mouth once daily at noon. Take with meals.   11/24/2024 Noon    aspirin 81 mg chewable tablet Chew 1 tablet (81 mg) once daily in the morning.   11/25/2024 Morning    atorvastatin (Lipitor) 80 mg tablet Take 1 tablet (80 mg) by mouth once daily at bedtime.   11/24/2024 Bedtime    cetirizine (ZyrTEC) 10 mg tablet Take 1 tablet (10 mg) by mouth once daily in the morning.   11/25/2024 Morning    cholecalciferol (Vitamin D-3) 25 MCG (1000 UT) tablet Take 1 tablet (1,000 Units) by mouth once daily at noon. Take with meals.   11/24/2024 Noon    levothyroxine (Synthroid, Levoxyl) 75 mcg tablet Take 1 tablet (75 mcg) by mouth once daily in the morning. Take before meals.   11/25/2024 Morning    metoprolol succinate XL (Toprol-XL) 25 mg 24 hr tablet Take 1 tablet (25 mg) by mouth once daily in the evening. Take with meals.   11/24/2024 Evening    omeprazole OTC (PriLOSEC OTC) 20 mg EC tablet Take 1 tablet (20 mg) by mouth once daily in the morning. Take before meals. Do not crush, chew, or split. (Patient taking differently: Take 1 tablet (20 mg) by mouth 2 times a day. Do not crush, chew, or split.) 90 tablet 3 11/25/2024 Morning    ramipril (Altace) 10 mg capsule Take 1 capsule (10 mg) by mouth once daily in the morning.   11/25/2024 Morning    tamsulosin (Flomax) 0.4 mg 24 hr capsule Take 1 capsule (0.4 mg) by mouth once daily at bedtime.   11/24/2024 Bedtime    triamterene-hydrochlorothiazid (Dyazide) 37.5-25 mg capsule Take 1 capsule by mouth once daily in the morning.   11/25/2024 Morning    Xhance 93 mcg/actuation aerosol breath activated 2 puffs 2 times a day.   11/24/2024 Evening    Dupixent Pen 300 mg/2 mL pen injector Inject 2 mL (300 mg) under the skin every 14 (fourteen) days.   11/20/2024       Review of Systems  Exam:   Appearance:  no acute distress, cooperative.  HEENT: normocephalic  "/atraumatic.  Cardiovascular/Lungs/Abdomen: No carotid bruits to auscultation bilaterally, heart is regular in rate and rhythm.  Extremities/Skin: no peripheral edema.    NEUROLOGICAL EXAMINATION:    Mental status:  alert and oriented to person, place, date and situation.  Mild confusion.   No dysarthria. No signs of aphasia.     Cranial nerves:    II/III: Fundoscopic examination attempted at bedside, limited. Visual fields are full. Pupils are 2 mm and reactive bilaterally.  III/IV/VI: Extraocular movements are full with no nystagmus.   V: Facial sensation is intact to light touch.  VII: Face is symmetric.  VIII: hearing is impaired bilaterally.  IX/X: Palate elevates symmetrically to phonation.  XI: Sternocleidomastoid is MRC 5/5 to strength testing.  XII: Tongue is midline.    Motor exam: Strength is MRC 5/5 throughout. tone is normal.    Sensory exam: Sensation is intact to light touch throughout.    Reflexes: Reflexes are 2+ and symmetric. Bilateral plantar responses are flexor.    Coordination: intact     Last Recorded Vitals  Blood pressure 141/70, pulse 51, temperature 36.9 °C (98.4 °F), resp. rate 18, height 1.753 m (5' 9\"), weight 79.4 kg (175 lb), SpO2 96%.    Relevant Results  Scheduled medications  [Held by provider] amLODIPine, 10 mg, oral, Nightly  aspirin, 81 mg, oral, q AM  atorvastatin, 80 mg, oral, Nightly  cetirizine, 10 mg, oral, q AM  clopidogrel, 75 mg, oral, Daily  enoxaparin, 40 mg, subcutaneous, q24h  levothyroxine, 75 mcg, oral, Daily before breakfast  [Held by provider] lisinopril, 20 mg, oral, Daily  [Held by provider] metoprolol succinate XL, 25 mg, oral, Daily with evening meal  [Held by provider] pantoprazole, 40 mg, oral, BID AC  pantoprazole, 40 mg, intravenous, Daily  perflutren lipid microspheres, 0.5-10 mL of dilution, intravenous, Once in imaging  perflutren protein A microsphere, 0.5 mL, intravenous, Once in imaging  sulfur hexafluoride microsphr, 2 mL, intravenous, Once in " imaging  tamsulosin, 0.4 mg, oral, Nightly  [Held by provider] triamterene-hydrochlorothiazid, 1 tablet, oral, Daily      Continuous medications     PRN medications  PRN medications: acetaminophen **OR** acetaminophen **OR** acetaminophen, hydrALAZINE **FOLLOWED BY** [START ON 11/27/2024] hydrALAZINE, labetaloL, OLANZapine  Results for orders placed or performed during the hospital encounter of 11/25/24 (from the past 24 hours)   Troponin I, High Sensitivity   Result Value Ref Range    Troponin I, High Sensitivity 14 0 - 20 ng/L   Blood Gas Venous Full Panel   Result Value Ref Range    POCT pH, Venous 7.55 (H) 7.33 - 7.43 pH    POCT pCO2, Venous 33 (L) 41 - 51 mm Hg    POCT pO2, Venous 46 (H) 35 - 45 mm Hg    POCT SO2, Venous 79 (H) 45 - 75 %    POCT Oxy Hemoglobin, Venous 77.2 (H) 45.0 - 75.0 %    POCT Hematocrit Calculated, Venous 44.0 41.0 - 52.0 %    POCT Sodium, Venous 130 (L) 136 - 145 mmol/L    POCT Potassium, Venous 3.1 (L) 3.5 - 5.3 mmol/L    POCT Chloride, Venous 94 (L) 98 - 107 mmol/L    POCT Ionized Calicum, Venous 1.15 1.10 - 1.33 mmol/L    POCT Glucose, Venous 182 (H) 74 - 99 mg/dL    POCT Lactate, Venous 2.3 (H) 0.4 - 2.0 mmol/L    POCT Base Excess, Venous 6.7 (H) -2.0 - 3.0 mmol/L    POCT HCO3 Calculated, Venous 28.9 (H) 22.0 - 26.0 mmol/L    POCT Hemoglobin, Venous 14.8 13.5 - 17.5 g/dL    POCT Anion Gap, Venous 10.0 10.0 - 25.0 mmol/L    Patient Temperature 37.0 degrees Celsius    FiO2 28 %   TSH with reflex to Free T4 if abnormal   Result Value Ref Range    Thyroid Stimulating Hormone 1.08 0.44 - 3.98 mIU/L   Acute Toxicology Panel, Blood   Result Value Ref Range    Acetaminophen <10.0 10.0 - 30.0 ug/mL    Salicylate  <3 4 - 20 mg/dL    Alcohol <10 <=10 mg/dL   Lipid Panel   Result Value Ref Range    Cholesterol 123 0 - 199 mg/dL    HDL-Cholesterol 38.4 mg/dL    Cholesterol/HDL Ratio 3.2     LDL Calculated 58 <=99 mg/dL    VLDL 27 0 - 40 mg/dL    Triglycerides 135 0 - 149 mg/dL    Non HDL  Cholesterol 85 0 - 149 mg/dL   Influenza A, and B PCR   Result Value Ref Range    Flu A Result Not Detected Not Detected    Flu B Result Not Detected Not Detected   Sars-CoV-2 PCR   Result Value Ref Range    Coronavirus 2019, PCR Not Detected Not Detected   Ammonia   Result Value Ref Range    Ammonia 32 16 - 53 umol/L   POCT GLUCOSE   Result Value Ref Range    POCT Glucose 186 (H) 74 - 99 mg/dL   Procalcitonin   Result Value Ref Range    Procalcitonin 0.03 <=0.07 ng/mL   POCT GLUCOSE   Result Value Ref Range    POCT Glucose 88 74 - 99 mg/dL   POCT GLUCOSE   Result Value Ref Range    POCT Glucose 92 74 - 99 mg/dL   CBC and Auto Differential   Result Value Ref Range    WBC 9.4 4.4 - 11.3 x10*3/uL    nRBC 0.0 0.0 - 0.0 /100 WBCs    RBC 4.42 (L) 4.50 - 5.90 x10*6/uL    Hemoglobin 14.8 13.5 - 17.5 g/dL    Hematocrit 42.3 41.0 - 52.0 %    MCV 96 80 - 100 fL    MCH 33.5 26.0 - 34.0 pg    MCHC 35.0 32.0 - 36.0 g/dL    RDW 12.7 11.5 - 14.5 %    Platelets 253 150 - 450 x10*3/uL    Neutrophils % 55.6 40.0 - 80.0 %    Immature Granulocytes %, Automated 0.3 0.0 - 0.9 %    Lymphocytes % 30.7 13.0 - 44.0 %    Monocytes % 10.1 2.0 - 10.0 %    Eosinophils % 2.8 0.0 - 6.0 %    Basophils % 0.5 0.0 - 2.0 %    Neutrophils Absolute 5.25 1.60 - 5.50 x10*3/uL    Immature Granulocytes Absolute, Automated 0.03 0.00 - 0.50 x10*3/uL    Lymphocytes Absolute 2.90 0.80 - 3.00 x10*3/uL    Monocytes Absolute 0.95 (H) 0.05 - 0.80 x10*3/uL    Eosinophils Absolute 0.26 0.00 - 0.40 x10*3/uL    Basophils Absolute 0.05 0.00 - 0.10 x10*3/uL   Basic Metabolic Panel   Result Value Ref Range    Glucose 91 74 - 99 mg/dL    Sodium 137 136 - 145 mmol/L    Potassium 3.3 (L) 3.5 - 5.3 mmol/L    Chloride 98 98 - 107 mmol/L    Bicarbonate 31 21 - 32 mmol/L    Anion Gap 11 10 - 20 mmol/L    Urea Nitrogen 13 6 - 23 mg/dL    Creatinine 0.79 0.50 - 1.30 mg/dL    eGFR 89 >60 mL/min/1.73m*2    Calcium 9.3 8.6 - 10.3 mg/dL   POCT GLUCOSE   Result Value Ref Range    POCT  Glucose 84 74 - 99 mg/dL   Electrocardiogram, 12-lead PRN ACS symptoms   Result Value Ref Range    Ventricular Rate 89 BPM    Atrial Rate 91 BPM    NV Interval 224 ms    QRS Duration 164 ms    QT Interval 439 ms    QTC Calculation(Bazett) 535 ms    P Axis 247 degrees    R Axis 239 degrees    T Axis 103 degrees    QRS Count 14 beats    Q Onset 251 ms    T Offset 470 ms    QTC Fredericia 500 ms        NIH Stroke Scale  1A. Level of Consciousness: Alert, Keenly Responsive  1B. Ask Month and Age: Both Questions Right  1C. Blink Eyes & Squeeze Hands: Performs Both Tasks  2. Best Gaze: Normal  3. Visual: No Visual Loss  4. Facial Palsy: Normal Symmetrical Movements  5A. Motor - Left Arm: No Drift  5B. Motor - Right Arm: No Drift  6A. Motor - Left Leg: No Drift  6B. Motor - Right Leg: No Drift  7. Limb Ataxia: Absent  8. Sensory Loss: Mild-to-Moderate Sensory Loss  9. Best Language: No Aphasia  10. Dysarthria: Normal  11. Extinction and Inattention: No Abnormality  NIH Stroke Scale: 1           Vinicio Coma Scale  Best Eye Response: Spontaneous  Best Verbal Response: Oriented  Best Motor Response: Follows commands  Vinicio Coma Scale Score: 15                 I have personally reviewed the following imaging results ECG 12 lead    Result Date: 11/26/2024  Sinus rhythm Prolonged NV interval Right bundle branch block Lateral infarct, acute Probable anteroseptal infarct, recent >>> Acute MI <<<    Electrocardiogram, 12-lead PRN ACS symptoms    Result Date: 11/26/2024  Sinus or ectopic atrial rhythm Borderline prolonged NV interval Nonspecific intraventricular conduction delay Anterior infarct, acute (LAD) >>> Acute MI <<<    MR angio neck wo IV contrast    Result Date: 11/25/2024  Interpreted By:  Jan Rock, STUDY: MR BRAIN WO IV CONTRAST; MR ANGIO HEAD WO IV CONTRAST; MR ANGIO NECK WO IV CONTRAST;  11/25/2024 7:39 pm   INDICATION: Signs/Symptoms:Stroke rule out. Stroke protocol.     COMPARISON: CTA head/neck  01/08/2023   ACCESSION NUMBER(S): [id number]; [id number]; [id number]   ORDERING CLINICIAN: ELYSE KLERMAN   TECHNIQUE: Multiplanar, multi-sequence images of the brain were obtained without contrast.   3D time-of-flight MR angiography of the head and neck was performed. The images were reviewed as source images and maximum intensity projections.   FINDINGS: MRI BRAIN:   Diffusion weighted images show a single subcentimeter focus of acute ischemic infarct within the subcortical white matter of the left temporal lobe.   Moderate periventricular and subcortical hemispheric white matter hypodensities are most compatible with chronic small vessel ischemic disease.Chronic lacunar infarct in the left caudal thalamic groove noted.   The major intracranial flow voids are preserved.   There is no acute intraparenchymal hemorrhage, mass, mass-effect, or an extra-axial fluid collection. There is no pathologic susceptibility artifact on GRE images.   The ventricular size and cerebral volume are age-concordant.   Normal morphology of midline structures. The craniovertebral junction is normal.   No acute abnormality of the orbits.   Retention cyst in the left maxillary sinus. Moderate mucosal thickening in the ethmoid and right frontal sinus.   The mastoid air cells are clear.     MRA HEAD: Severely narrowed left intracranial vertebral artery similar to 01/08/2023. Otherwise, no hemodynamically significant intracranial stenosis or large vessel occlusion. There is no intracranial aneurysm.     MRA NECK: Source images are motion degraded. The origins of the carotid and vertebral arteries are not included. Mild spin-dephasing artifact is noted at the carotid bulbs.   COMMON/INTERNAL CAROTID ARTERIES: There atherosclerosis of the left carotid bulb and proximal postbulbar left ICA resulting in up to 50% stenosis. There is non hemodynamically significant atherosclerotic plaque of the right carotid bulb. The visualized common carotid  arteries are widely patent.   VERTEBRAL ARTERIES:  No occlusion, hemodynamically significant stenosis, or dissection.       MRI BRAIN: 1. Single subcentimeter acute ischemic infarct in the subcortical white matter of the left temporal lobe. 2. Moderate burden of supratentorial chronic small vessel ischemic disease.   MRA HEAD AND NECK: 1. No evidence of major vessel occlusion or significant stenosis on MRA head. 2. 50% stenosis of the proximal postbulbar left ICA. Otherwise, no hemodynamically significant stenosis in the neck. 3. Severely narrowed left intracranial vertebral artery, unchanged from prior study.   MACRO: None.   Signed by: Jan Rock 11/25/2024 9:27 PM Dictation workstation:   [id number]    MR brain wo IV contrast    Result Date: 11/25/2024  Interpreted By:  Jan Rock, STUDY: MR BRAIN WO IV CONTRAST; MR ANGIO HEAD WO IV CONTRAST; MR ANGIO NECK WO IV CONTRAST;  11/25/2024 7:39 pm   INDICATION: Signs/Symptoms:Stroke rule out. Stroke protocol.     COMPARISON: CTA head/neck 01/08/2023   ACCESSION NUMBER(S): [id number]; [id number]; [id number]   ORDERING CLINICIAN: ELYSE KLERMAN   TECHNIQUE: Multiplanar, multi-sequence images of the brain were obtained without contrast.   3D time-of-flight MR angiography of the head and neck was performed. The images were reviewed as source images and maximum intensity projections.   FINDINGS: MRI BRAIN:   Diffusion weighted images show a single subcentimeter focus of acute ischemic infarct within the subcortical white matter of the left temporal lobe.   Moderate periventricular and subcortical hemispheric white matter hypodensities are most compatible with chronic small vessel ischemic disease.Chronic lacunar infarct in the left caudal thalamic groove noted.   The major intracranial flow voids are preserved.   There is no acute intraparenchymal hemorrhage, mass, mass-effect, or an extra-axial fluid collection. There is no pathologic susceptibility  artifact on GRE images.   The ventricular size and cerebral volume are age-concordant.   Normal morphology of midline structures. The craniovertebral junction is normal.   No acute abnormality of the orbits.   Retention cyst in the left maxillary sinus. Moderate mucosal thickening in the ethmoid and right frontal sinus.   The mastoid air cells are clear.     MRA HEAD: Severely narrowed left intracranial vertebral artery similar to 01/08/2023. Otherwise, no hemodynamically significant intracranial stenosis or large vessel occlusion. There is no intracranial aneurysm.     MRA NECK: Source images are motion degraded. The origins of the carotid and vertebral arteries are not included. Mild spin-dephasing artifact is noted at the carotid bulbs.   COMMON/INTERNAL CAROTID ARTERIES: There atherosclerosis of the left carotid bulb and proximal postbulbar left ICA resulting in up to 50% stenosis. There is non hemodynamically significant atherosclerotic plaque of the right carotid bulb. The visualized common carotid arteries are widely patent.   VERTEBRAL ARTERIES:  No occlusion, hemodynamically significant stenosis, or dissection.       MRI BRAIN: 1. Single subcentimeter acute ischemic infarct in the subcortical white matter of the left temporal lobe. 2. Moderate burden of supratentorial chronic small vessel ischemic disease.   MRA HEAD AND NECK: 1. No evidence of major vessel occlusion or significant stenosis on MRA head. 2. 50% stenosis of the proximal postbulbar left ICA. Otherwise, no hemodynamically significant stenosis in the neck. 3. Severely narrowed left intracranial vertebral artery, unchanged from prior study.   MACRO: None.   Signed by: Jan Rock 11/25/2024 9:27 PM Dictation workstation:   [id number]    MR angio head wo IV contrast    Result Date: 11/25/2024  Interpreted By:  Jan Rock, STUDY: MR BRAIN WO IV CONTRAST; MR ANGIO HEAD WO IV CONTRAST; MR ANGIO NECK WO IV CONTRAST;  11/25/2024 7:39 pm    INDICATION: Signs/Symptoms:Stroke rule out. Stroke protocol.     COMPARISON: CTA head/neck 01/08/2023   ACCESSION NUMBER(S): [id number]; [id number]; [id number]   ORDERING CLINICIAN: ELYSE KLERMAN   TECHNIQUE: Multiplanar, multi-sequence images of the brain were obtained without contrast.   3D time-of-flight MR angiography of the head and neck was performed. The images were reviewed as source images and maximum intensity projections.   FINDINGS: MRI BRAIN:   Diffusion weighted images show a single subcentimeter focus of acute ischemic infarct within the subcortical white matter of the left temporal lobe.   Moderate periventricular and subcortical hemispheric white matter hypodensities are most compatible with chronic small vessel ischemic disease.Chronic lacunar infarct in the left caudal thalamic groove noted.   The major intracranial flow voids are preserved.   There is no acute intraparenchymal hemorrhage, mass, mass-effect, or an extra-axial fluid collection. There is no pathologic susceptibility artifact on GRE images.   The ventricular size and cerebral volume are age-concordant.   Normal morphology of midline structures. The craniovertebral junction is normal.   No acute abnormality of the orbits.   Retention cyst in the left maxillary sinus. Moderate mucosal thickening in the ethmoid and right frontal sinus.   The mastoid air cells are clear.     MRA HEAD: Severely narrowed left intracranial vertebral artery similar to 01/08/2023. Otherwise, no hemodynamically significant intracranial stenosis or large vessel occlusion. There is no intracranial aneurysm.     MRA NECK: Source images are motion degraded. The origins of the carotid and vertebral arteries are not included. Mild spin-dephasing artifact is noted at the carotid bulbs.   COMMON/INTERNAL CAROTID ARTERIES: There atherosclerosis of the left carotid bulb and proximal postbulbar left ICA resulting in up to 50% stenosis. There is non hemodynamically  significant atherosclerotic plaque of the right carotid bulb. The visualized common carotid arteries are widely patent.   VERTEBRAL ARTERIES:  No occlusion, hemodynamically significant stenosis, or dissection.       MRI BRAIN: 1. Single subcentimeter acute ischemic infarct in the subcortical white matter of the left temporal lobe. 2. Moderate burden of supratentorial chronic small vessel ischemic disease.   MRA HEAD AND NECK: 1. No evidence of major vessel occlusion or significant stenosis on MRA head. 2. 50% stenosis of the proximal postbulbar left ICA. Otherwise, no hemodynamically significant stenosis in the neck. 3. Severely narrowed left intracranial vertebral artery, unchanged from prior study.   MACRO: None.   Signed by: Jan Rock 11/25/2024 9:27 PM Dictation workstation:   [id number]    CT abdomen pelvis w IV contrast    Result Date: 11/25/2024  Interpreted By:  Henry Scott, STUDY: CT ABDOMEN PELVIS W IV CONTRAST;  11/25/2024 12:00 pm   INDICATION: Signs/Symptoms:abd pain.   COMPARISON: None.   ACCESSION NUMBER(S): [id number]   ORDERING CLINICIAN: VIC SCHMIDT   TECHNIQUE: CT of the abdomen and pelvis was performed. Contiguous axial images were obtained at 3 mm slice thickness through the abdomen and pelvis. Coronal and sagittal reconstructions at 3 mm slice thickness were performed.  Omnipaque 350 was injected intravenously.   FINDINGS: LOWER CHEST: Included lung bases are clear. There is fairly extensive coronary artery atherosclerotic calcification primarily involving the LAD.   ABDOMEN:   LIVER: The hepatic parenchyma is homogeneous without evidence of focal liver lesions.The hepatic size is normal.   SPLEEN: The spleen is normal in size and homogeneous.   ADRENAL GLANDS: Bilateral adrenal glands appear normal.   KIDNEYS AND URETERS: The renal cortices are unremarkable and the renal sizes within normal limits , with cortical cyst(s).   The ureteral courses are unremarkable without  dilatation or radiodense calculi.   PANCREAS: The pancreas appears unremarkable, there is no ductal dilatation or masses.   GALLBLADDER: No radiodense calculi, wall thickening or pericholecystic fluid.   BILE DUCTS: There is no biliary dilatation or filling defects.     VESSELS: Moderate to extensive atherosclerotic calcifications are noted predominantly at the aorta and iliac system. There is also moderate atherosclerotic calcification of the superior mesenteric and renal arteries, and mild at the celiac artery.   PERITONEUM AND RETROPERITONEUM: No ascites or free air, no fluid collection.   The retroperitoneum appears unremarkable, and without significant adenopathy.   No enlarged mesenteric lymph nodes.   BOWEL: Fecal residue seen throughout the colon, non specific but suggests constipation.  The bowel otherwise is unremarkable without significant dilatation or mural thickening.   PELVIS:   BLADDER: The urinary bladder contour is smooth.   REPRODUCTIVE ORGANS: Prostatomegaly with gland measuring 6.2cm. The pelvic structures otherwise are unremarkable.     BONE, ABDOMINAL WALL AND OTHER FINDINGS: There is marked narrowing of the L5-S1 disc interspace with marked anterior marginal osteophyte formation, grade 1 spondylolisthesis and bilateral spondylolysis. Otherwise there is mild spondylosis at the thoracic spine, no suspicious osseous lesions.   The abdominal wall soft tissues appear normal.       1.  Atherosclerosis as described. 2. Possible constipation. Otherwise no acute findings.   MACRO: 1. None   Signed by: Henry Scott 11/25/2024 12:25 PM Dictation workstation:   [id number]    XR chest 1 view    Result Date: 11/25/2024  Interpreted By:  Bryan Ruff, STUDY: XR CHEST 1 VIEW; 11/25/2024 11:18 am   INDICATION: CLINICAL INFORMATION: Signs/Symptoms:sepsis.   COMPARISON: 04/02/2024   ACCESSION NUMBER(S): [id number]   ORDERING CLINICIAN: ELYSE KLERMAN   TECHNIQUE: Portable chest one view.   FINDINGS: The  cardiac size is indeterminate in view of the AP projection. Patchy bibasilar atelectasis and/or infiltrate is present, slightly more marked on the left. Sternal fixations devices and mediastinal clips are present. No effusions are appreciated within limits of this study.       Patchy bibasilar atelectasis and/or infiltrate slightly more marked on the left. Postsurgical findings as above.   MACRO: none   Signed by: Bryan Ruff 11/25/2024 11:37 AM Dictation workstation:   [id number]    CT brain attack head wo IV contrast    Result Date: 11/25/2024  Interpreted By:  Millie Wesley, STUDY: CT BRAIN ATTACK HEAD WO IV CONTRAST; ;  11/25/2024 9:22 am   INDICATION: Signs/Symptoms:Stroke Evaluation.   COMPARISON: 01/08/2023   ACCESSION NUMBER(S): [id number]   ORDERING CLINICIAN: ELYSE KLERMAN   TECHNIQUE: Serial axial images of the head were obtained without intravenous contrast. Sagittal and coronal reconstructions were generated.   FINDINGS: The ventricles are midline and slightly enlarged. There is prominence of the cortical sulci and superior cerebellar cisterns. There is spotty periventricular white matter disease.   There are bilateral small lacunar infarcts.   There are no acute parenchymal abnormalities. There is no hemorrhage or extra-axial fluid.   There is no obvious skull fracture.   There is paranasal sinus disease. The mastoid air cells are unremarkable. There is no obvious scalp hematoma. The patient appears to be status post bilateral cataract surgery.   COMPARISON OF FINDINGS: Brain is similar.       No acute intracranial abnormality. Mild generalized cerebral and cerebellar atrophy.      XR chest 2 views    Result Date: 11/12/2024  * * *Final Report* * * DATE OF EXAM: Nov 12 2024 12:30PM   JUDE   [id number]  -  XR CHEST 2V FRONTAL/LAT  / ACCESSION #  [id number] PROCEDURE REASON: multiple diagnoses      * * * * Physician Interpretation * * * *  EXAMINATION:  CHEST RADIOGRAPH (2 VIEW FRONTAL & LATERAL) CLINICAL  HISTORY:  Hiatal hernia Pre-procedure lab exam MQ:  [id number] EXAM DATE/TIME:  11/12/2024 12:30 PM COMPARISON:  10/27/2024 RESULT: Lines, tubes, and devices:  None. Lungs and pleura:  No new focal lung consolidation is seen.  There is minimal bibasilar atelectasis.  No substantial pleural effusion is seen. There is no pneumothorax. Cardiomediastinal silhouette:  Stable cardiomediastinal silhouette.  The aortic arch is atherosclerotic. Status post median sternotomy and coronary artery bypass grafting.  A hiatal hernia is suspected. Bones and soft tissues:  Degenerative changes are seen in the thoracic spine.        Assessment/Plan:  Primary Dx: Left temporal artery infarct.  May be from atherosclerotic disease.  It is small vessel.  He does have left ICA stenosis as well which may be contributing.  This likely cause worsening confusion.  Watch for any seizure-like activity as temporal lobe strokes can cause seizure in elderly.  Patient will need echocardiogram with bubble.  He will need to stay on telemetry.  He will need PT OT/ slt eval and tx. he likely will benefit of acute rehab before discharge.l  He will also benefit from neurology follow-up as outpatient for the next 2 months.  Secondary Dx: Hyperlipidemia, hypertension-  BP goal now normotensive, out of 24 hour window.  Continue statin , at goal < 70.  2 week heart monitor on discharge.   At risk of delirium. Avoid sedative if possible.    DVT prophylaxis.   Thank you for this consultation.  Please call neurologist on-call for any questions or concerns.

## 2024-11-26 NOTE — PROGRESS NOTES
Physical Therapy    Physical Therapy Evaluation    Patient Name: Henry Alexandre Jr.  MRN: [id number]  Today's Date: 11/26/2024   Time Calculation  Start Time: 1350  Stop Time: 1402  Time Calculation (min): 12 min  622/622-A    Assessment/Plan   PT Assessment  PT Assessment Results: Decreased strength, Impaired balance, Decreased mobility, Decreased safety awareness  Rehab Prognosis: Good  Barriers to Discharge: no significant barriers to return home w/ support of family  Evaluation/Treatment Tolerance: Patient tolerated treatment well  End of Session Communication: Bedside nurse  Assessment Comment: Continued skilled PT intervention indicated to facilitate increased strength, balance & gait stability  End of Session Patient Position: Up in chair, Alarm on (call light in reach)  IP OR SWING BED PT PLAN  Inpatient or Swing Bed: Inpatient  PT Plan  Treatment/Interventions: Transfer training, Gait training, Stair training, Balance training, Therapeutic exercise, Therapeutic activity  PT Plan: Ongoing PT  PT Frequency: 3 times per week  PT Discharge Recommendations: Low intensity level of continued care, 24 hr supervision due to cognition  PT Recommended Transfer Status: Assist x1  PT - OK to Discharge: Yes (when cleared by medical team)    Subjective     Current Problem:  1. Altered mental status, unspecified altered mental status type        2. Sepsis, due to unspecified organism, unspecified whether acute organ dysfunction present (Multi)        3. Coronary artery disease involving native coronary artery of native heart without angina pectoris  Transthoracic Echo (TTE) Complete    Transthoracic Echo (TTE) Complete        Patient Active Problem List   Diagnosis    CAD (coronary artery disease), native coronary artery    Coronary artery disease involving coronary bypass graft of native heart without angina pectoris    Cerebrovascular accident (CVA) due to bilateral stenosis of posterior cerebral arteries (Multi)     Cognitive communication deficit    Dysphagia    Primary hypertension    Aortic valve sclerosis    Left bundle branch block (LBBB)    Cognitive impairment    Colon polyp    Complex tear of medial meniscus, current injury, right knee, initial encounter    Diverticulosis    Fatigue    Hyperlipidemia    Hypertrophy of tongue papillae    Localized, primary osteoarthritis    Mild vascular dementia    Osteoarthritis    Presence of right artificial knee joint    Shoulder weakness    Spondylolisthesis of lumbosacral region    Large hiatal hernia    Hiatal hernia    Altered mental status, unspecified altered mental status type       General Visit Information:  General  Reason for Referral: PT eval & treat/impaired mobility; DX: ischemic infarct lt temporal lobe; 11/25/24 ams, confusion, aphasia; mri: acute ischemic infarct lt temporal lobe  Referred By: Fracisco Patel DO  Caregiver Feedback: Per conference w/ RN patient stable to participate in therapy  Co-Treatment: OT  Co-Treatment Reason: to maximize pt safety & mobility  Patient Position Received: Bed, 3 rail up, Alarm on  General Comment: Pleasant & cooperative, receptive to mobility& instructions; slightly slow processing, mild guarded mobility    Home Living:  Home Living  Home Living Comments: Pt lives with his wife in a 2 story house with 2 GENEVA without a HR and full flight to 2nd floor with HR to bedroom and bathroom. 1/2 bathroom first floor with standard toilet and full bathroom 2nd floor with tub/shower and high toilet. Laundry is on 1st floor.    Prior Level of Function:  Prior Function Per Pt/Caregiver Report  Prior Function Comments: Independent with ADLs and functional transfers (Amb ind without an AD, wife does all IADLs and drives) denies h/o falls    Precautions:  Precautions  Precautions Comment: fall, alarm, aspiration, young, corrective lenses    Vital Signs:     Objective     Pain:  Pain Assessment  0-10 (Numeric) Pain Score: 0 - No  pain    Cognition:  Cognition  Overall Cognitive Status:  (oriented to person/place/month)    General Assessments:    Sensation  Light Touch: No apparent deficits      Coordination  Alternating Toe Taps: Intact     Functional Assessments:     Bed Mobility  Bed Mobility:  (supervision supine>sit)  Transfers  Transfer:  (sba sit<>stand cues for monitoring tolerance to position change)  Ambulation/Gait Training  Ambulation/Gait Training Performed:  (cga w/ use of gait belt, no device, mild instability/guarding w/ decreased ue swing & heel strike but no acute loss of balance)   Extremity/Trunk Assessments:        RLE   RLE :  (arom wfl, strength grossly 4/5 rle symmetrical to lle)  LLE   LLE :  (arom wfl, strength grossly 4/5 lle symmetrical to rle)    Outcome Measures:     [facility] Basic Mobility  Turning from your back to your side while in a flat bed without using bedrails: None  Moving from lying on your back to sitting on the side of a flat bed without using bedrails: A little  Moving to and from bed to chair (including a wheelchair): A little  Standing up from a chair using your arms (e.g. wheelchair or bedside chair): A little  To walk in hospital room: A little  Climbing 3-5 steps with railing: A little  Basic Mobility - Total Score: 19        Goals:  Encounter Problems       Encounter Problems (Active)       PT Problem       STG - Pt will transfer STS with supervision   (Progressing)       Start:  11/26/24    Expected End:  12/10/24            STG - Pt will amb >=100' using supervision (Progressing)       Start:  11/26/24    Expected End:  12/10/24            STG -  Pt will navigate >=2stairs simulating home environment with sba  (Progressing)       Start:  11/26/24    Expected End:  12/10/24            STG - Pt will perform a B LE ther ex program of 2-3 sets of 10  (Progressing)       Start:  11/26/24    Expected End:  12/10/24               Pain - Adult            Education Documentation  Mobility Training,  taught by Katie Cheema PT at 11/26/2024  2:23 PM.  Learner: Patient  Readiness: Acceptance  Method: Explanation  Response: Verbalizes Understanding  Comment: safety, activity progression          no

## 2024-11-26 NOTE — NURSING NOTE
11/26/24 1230 Patient Navigator   I introduced myself to the patient, his wife Kyra, and explained my role. Pt has limited cognitive understanding but was able to participate in some of my education. Kyra states that the patient was active and walked with her daily. She states she prepares his meals and he eats a variety of healthy foods. I reviewed the information located in the Education tab and the handouts listed below. I reviewed the following lab values and what they indicate: cholesterol, HDL, LDL, triglycerides, and A1C. I gave Kyra my business card & instructed her to call/email me as needed. She verbalized understanding of the above note and appreciated the information. I have updated the patient's RN, Will, of my visit.    Handouts:   Stroke folder  Lifestyle changes to prevent a stroke- American Stroke Association  How do I follow a healthy diet pattern? American Heart Association  What can I eat? American Diabetes Association    Liv MUNOZ, RN  Patient Navigator  Stroke Educator  Diabetes Care &

## 2024-11-27 ENCOUNTER — APPOINTMENT (OUTPATIENT)
Dept: CARDIOLOGY | Facility: HOSPITAL | Age: 79
End: 2024-11-27
Payer: MEDICARE

## 2024-11-27 ENCOUNTER — PHARMACY VISIT (OUTPATIENT)
Dept: PHARMACY | Facility: CLINIC | Age: 79
End: 2024-11-27
Payer: COMMERCIAL

## 2024-11-27 VITALS
BODY MASS INDEX: 25.92 KG/M2 | DIASTOLIC BLOOD PRESSURE: 61 MMHG | TEMPERATURE: 96.4 F | HEART RATE: 58 BPM | RESPIRATION RATE: 17 BRPM | OXYGEN SATURATION: 91 % | HEIGHT: 69 IN | SYSTOLIC BLOOD PRESSURE: 109 MMHG | WEIGHT: 175 LBS

## 2024-11-27 PROBLEM — R41.82 ALTERED MENTAL STATUS, UNSPECIFIED ALTERED MENTAL STATUS TYPE: Status: RESOLVED | Noted: 2024-11-25 | Resolved: 2024-11-27

## 2024-11-27 LAB
ANION GAP SERPL CALC-SCNC: 10 MMOL/L (ref 10–20)
AORTIC VALVE MEAN GRADIENT: 8 MMHG
AORTIC VALVE PEAK VELOCITY: 1.78 M/S
AV PEAK GRADIENT: 13 MMHG
AVA (PEAK VEL): 1.69 CM2
AVA (VTI): 1.39 CM2
BODY SURFACE AREA: 1.97 M2
BUN SERPL-MCNC: 14 MG/DL (ref 6–23)
CALCIUM SERPL-MCNC: 8.9 MG/DL (ref 8.6–10.3)
CHLORIDE SERPL-SCNC: 101 MMOL/L (ref 98–107)
CO2 SERPL-SCNC: 30 MMOL/L (ref 21–32)
CREAT SERPL-MCNC: 0.77 MG/DL (ref 0.5–1.3)
EGFRCR SERPLBLD CKD-EPI 2021: 90 ML/MIN/1.73M*2
EJECTION FRACTION APICAL 4 CHAMBER: 54.3
EJECTION FRACTION: 58 %
GLUCOSE BLD MANUAL STRIP-MCNC: 130 MG/DL (ref 74–99)
GLUCOSE BLD MANUAL STRIP-MCNC: 140 MG/DL (ref 74–99)
GLUCOSE BLD MANUAL STRIP-MCNC: 94 MG/DL (ref 74–99)
GLUCOSE BLD MANUAL STRIP-MCNC: 98 MG/DL (ref 74–99)
GLUCOSE SERPL-MCNC: 163 MG/DL (ref 74–99)
HOLD SPECIMEN: NORMAL
LEFT ATRIUM VOLUME AREA LENGTH INDEX BSA: 20.1 ML/M2
LEFT VENTRICLE INTERNAL DIMENSION DIASTOLE: 4.4 CM (ref 3.5–6)
LEFT VENTRICULAR OUTFLOW TRACT DIAMETER: 2.1 CM
MAGNESIUM SERPL-MCNC: 1.76 MG/DL (ref 1.6–2.4)
MITRAL VALVE E/A RATIO: 0.55
POTASSIUM SERPL-SCNC: 3.2 MMOL/L (ref 3.5–5.3)
RIGHT VENTRICLE FREE WALL PEAK S': 6.74 CM/S
RIGHT VENTRICLE PEAK SYSTOLIC PRESSURE: 23.4 MMHG
SODIUM SERPL-SCNC: 138 MMOL/L (ref 136–145)
TRICUSPID ANNULAR PLANE SYSTOLIC EXCURSION: 0.9 CM

## 2024-11-27 PROCEDURE — 2500000001 HC RX 250 WO HCPCS SELF ADMINISTERED DRUGS (ALT 637 FOR MEDICARE OP): Performed by: INTERNAL MEDICINE

## 2024-11-27 PROCEDURE — 82947 ASSAY GLUCOSE BLOOD QUANT: CPT

## 2024-11-27 PROCEDURE — 83735 ASSAY OF MAGNESIUM: CPT

## 2024-11-27 PROCEDURE — 36415 COLL VENOUS BLD VENIPUNCTURE: CPT

## 2024-11-27 PROCEDURE — 99239 HOSP IP/OBS DSCHRG MGMT >30: CPT

## 2024-11-27 PROCEDURE — 2500000002 HC RX 250 W HCPCS SELF ADMINISTERED DRUGS (ALT 637 FOR MEDICARE OP, ALT 636 FOR OP/ED)

## 2024-11-27 PROCEDURE — 93247 EXT ECG>7D<15D SCAN A/R: CPT

## 2024-11-27 PROCEDURE — 2500000001 HC RX 250 WO HCPCS SELF ADMINISTERED DRUGS (ALT 637 FOR MEDICARE OP)

## 2024-11-27 PROCEDURE — 92526 ORAL FUNCTION THERAPY: CPT | Mod: GN

## 2024-11-27 PROCEDURE — 80048 BASIC METABOLIC PNL TOTAL CA: CPT

## 2024-11-27 PROCEDURE — RXMED WILLOW AMBULATORY MEDICATION CHARGE

## 2024-11-27 PROCEDURE — 2500000002 HC RX 250 W HCPCS SELF ADMINISTERED DRUGS (ALT 637 FOR MEDICARE OP, ALT 636 FOR OP/ED): Performed by: INTERNAL MEDICINE

## 2024-11-27 PROCEDURE — 99233 SBSQ HOSP IP/OBS HIGH 50: CPT | Performed by: PSYCHIATRY & NEUROLOGY

## 2024-11-27 RX ORDER — POTASSIUM CHLORIDE 20 MEQ/1
40 TABLET, EXTENDED RELEASE ORAL ONCE
Status: COMPLETED | OUTPATIENT
Start: 2024-11-27 | End: 2024-11-27

## 2024-11-27 RX ORDER — POTASSIUM CHLORIDE 1.5 G/1.58G
40 POWDER, FOR SOLUTION ORAL ONCE
Status: COMPLETED | OUTPATIENT
Start: 2024-11-27 | End: 2024-11-27

## 2024-11-27 RX ORDER — CLOPIDOGREL BISULFATE 75 MG/1
75 TABLET ORAL DAILY
Qty: 19 TABLET | Refills: 0 | Status: SHIPPED | OUTPATIENT
Start: 2024-11-28 | End: 2024-12-17

## 2024-11-27 RX ORDER — PANTOPRAZOLE SODIUM 40 MG/1
40 TABLET, DELAYED RELEASE ORAL
Qty: 60 TABLET | Refills: 0 | Status: SHIPPED | OUTPATIENT
Start: 2024-11-27 | End: 2024-12-27

## 2024-11-27 ASSESSMENT — PAIN SCALES - GENERAL
PAINLEVEL_OUTOF10: 1
PAINLEVEL_OUTOF10: 0 - NO PAIN
PAINLEVEL_OUTOF10: 2

## 2024-11-27 ASSESSMENT — COGNITIVE AND FUNCTIONAL STATUS - GENERAL
TOILETING: A LITTLE
HELP NEEDED FOR BATHING: A LITTLE
MOVING FROM LYING ON BACK TO SITTING ON SIDE OF FLAT BED WITH BEDRAILS: A LITTLE
STANDING UP FROM CHAIR USING ARMS: A LITTLE
CLIMB 3 TO 5 STEPS WITH RAILING: A LITTLE
TURNING FROM BACK TO SIDE WHILE IN FLAT BAD: A LITTLE
WALKING IN HOSPITAL ROOM: A LITTLE
DRESSING REGULAR LOWER BODY CLOTHING: A LITTLE
DAILY ACTIVITIY SCORE: 19
DRESSING REGULAR UPPER BODY CLOTHING: A LITTLE
MOBILITY SCORE: 18
PERSONAL GROOMING: A LITTLE
MOVING TO AND FROM BED TO CHAIR: A LITTLE

## 2024-11-27 ASSESSMENT — PAIN - FUNCTIONAL ASSESSMENT
PAIN_FUNCTIONAL_ASSESSMENT: 0-10

## 2024-11-27 ASSESSMENT — PAIN DESCRIPTION - LOCATION: LOCATION: OTHER (COMMENT)

## 2024-11-27 ASSESSMENT — PAIN SCALES - WONG BAKER: WONGBAKER_NUMERICALRESPONSE: HURTS LITTLE BIT

## 2024-11-27 NOTE — PROGRESS NOTES
11/27/24 1534   Discharge Planning   Living Arrangements Spouse/significant other   Support Systems Spouse/significant other;Family members   Assistance Needed independent at baseline   Type of Residence Private residence   Number of Stairs to Enter Residence 3   Expected Discharge Disposition Home   Does the patient need discharge transport arranged? No     I met with patient and his family at the bedside, verified insurance and demographics.  Patient does not use mobility aids, denies falls and is independent with ADLs.  He does not drive; his wife does all the shopping, errands, etc.  Patient denies homegoing needs; ADOD today.  Adali MORENO TCC   Detail Level: Detailed Detail Level: Zone Detail Level: Generalized

## 2024-11-27 NOTE — DISCHARGE SUMMARY
"Hospital Medicine Discharge Summary    Patient Name: Henry Alexandre Jr.  YOB: 1945    Discharge Diagnosis: Altered mental status, unspecified altered mental status type   Discharge Date: 11/27/24  Discharge Location: Home    Hospital Course:  Henry Alexandre Jr. is a 79 y.o. male with a history of hiatal hernia s/p repair in October, GERD, CVA (no residual deficits), vascular dementia, CAD status post CABG 2003, hypertension, hyperlipidemia, hypothyroidism, BPH, dysphagia who was brought to [facility] for worsening confusion and aphasia.  CT did not show any hemorrhage.  MRI revealed left temporal artery infarct.  Permissive hypertension was allowed for 24 hours.  Patient was started on aspirin and Plavix.  His risk factors are optimized including high intensity statin.  Neurology was consulted.  Given today patient was evaluated by PT/OT/SLP.  He did not require any skilled nursing.  Patient  wife declined home health care.  His omeprazole was discontinued and he was discharged on pantoprazole for his GERD/hiatal hernia.  Patient was discharged home and advised to follow-up with neurology/his PCP within 1 week.    -Follow up with primary care physician for laboratory work/imaging and relevant follow up    Physical Exam:     /70   Pulse 59   Temp 35.8 °C (96.4 °F)   Resp 17   Ht 1.753 m (5' 9\")   Wt 79.4 kg (175 lb)   SpO2 93%   BMI 25.84 kg/m²     General: Pleasant and cooperative  HEENT: Normocephalic, atraumatic, mucus membranes moist.  Eyes: EOMI  Neck:  Trachea midline.    Chest: Symmetric chest expansion, CTA bilaterally   CV:  Regular rate, regular rhythm.  Positive S1/S2.  : Urinary catheter in place, draining appropriately  Abdomen: soft, non-tender, non-distended, no guarding or peritoneal signs   Extremities:  No lower extremity edema  Neurological: ANO x 3  Psych: appropriate affect and behavior   Skin:  Warm and dry      Discharge Medications:     Your medication " list        START taking these medications        Instructions Last Dose Given Next Dose Due   clopidogrel 75 mg tablet  Commonly known as: Plavix  Start taking on: November 28, 2024      Take 1 tablet (75 mg) by mouth once daily for 19 doses.              CHANGE how you take these medications        Instructions Last Dose Given Next Dose Due   omeprazole OTC 20 mg EC tablet  Commonly known as: PriLOSEC OTC  What changed: when to take this      Take 1 tablet (20 mg) by mouth once daily in the morning. Take before meals. Do not crush, chew, or split.              CONTINUE taking these medications        Instructions Last Dose Given Next Dose Due   amLODIPine 10 mg tablet  Commonly known as: Norvasc           ascorbic acid 1,000 mg tablet  Commonly known as: Vitamin C           aspirin 81 mg chewable tablet           atorvastatin 80 mg tablet  Commonly known as: Lipitor           cetirizine 10 mg tablet  Commonly known as: ZyrTEC           cholecalciferol 25 MCG (1000 UT) tablet  Commonly known as: Vitamin D-3           Dupixent Pen 300 mg/2 mL pen injector  Generic drug: dupilumab           Flomax 0.4 mg 24 hr capsule  Generic drug: tamsulosin           levothyroxine 75 mcg tablet  Commonly known as: Synthroid, Levoxyl           metoprolol succinate XL 25 mg 24 hr tablet  Commonly known as: Toprol-XL           ramipril 10 mg capsule  Commonly known as: Altace           triamterene-hydrochlorothiazid 37.5-25 mg capsule  Commonly known as: Dyazide           Xhance 93 mcg/actuation aerosol breath activated  Generic drug: fluticasone propionate                     Where to Get Your Medications        These medications were sent to [facility]  [address] [place] [address]      Hours: 8:30 AM to 5:00 PM Mon - Fri Phone: [phone]   clopidogrel 75 mg tablet        This is a preliminary note written by the resident. Please wait for attending addendum for finalization of note and  recommendations.    Dr. Fracisco Patel  Internal Medicine

## 2024-11-27 NOTE — PROGRESS NOTES
Speech-Language Pathology    SLP Adult Inpatient  Speech-Language Pathology Treatment     Patient Name: Henry Alexandre Jr.  MRN: [id number]  Today's Date: 11/27/2024  Time Calculation  Start Time: 1025  Stop Time: 1045  Time Calculation (min): 20 min         Current Problem:   1. Altered mental status, unspecified altered mental status type        2. Sepsis, due to unspecified organism, unspecified whether acute organ dysfunction present (Multi)        3. Coronary artery disease involving native coronary artery of native heart without angina pectoris  Transthoracic Echo (TTE) Complete    Transthoracic Echo (TTE) Complete    Holter Or Event Cardiac Monitor    Holter Or Event Cardiac Monitor      4. Mini stroke  Holter Or Event Cardiac Monitor    Holter Or Event Cardiac Monitor            SLP Assessment:  SLP TX Intervention Outcome: Making Progress Towards Goals  Treatment Tolerance: Patient tolerated treatment well  Medical Staff Made Aware: Yes  Strengths: Family/Caregiver Support       BSE follow up x1  showing overall WNL oropharyngeal swallow w/o evidence or clinical indicators of aspiration. Continue the GI appropriate restricted Soft PO diet with thins.       Speech /Language screen completed with wife Kyra  present who stated pt without naming issues this date and mentation /speech skills now at baseline.   Pt able to name 15/15 items in the room, follow 2 step directives, and carry on a conversation without naming or paraphasia errors.  STM reduced which is baseline.   No ST needs at discharge. Wife agrees.      Plan:  Inpatient/Swing Bed or Outpatient: Inpatient  SLP Frequency: 1x per week  SLP Discharge Recommendations: Home with no further SLP  Discussed POC: Caregiver/family (pt's wife ,DO)  Discussed Risks/Benefits: Yes, Caregiver/Family  Patient/Caregiver Agreeable: Yes  SLP - OK to Discharge: Yes    Goals ( Established 11/26/24)  Pt will swallow GI restrictive soft /thin liquid diet without e/o  dysphagia.  Goal Met 11/27/24   Determine in house S/L evaluation timing . Goal no longer needed with a S/L  screen only completed this date as pt's  mentation /speech skills have returned to their  baseline function. 11/27/24        Subjective   Current Problem:  CVA    Most Recent Visit:  SLP Received On: 11/27/24    General Visit Information:      Alert/interactive , clear speech with no paraphasias noted     Pain Assessment:  Pain Assessment  Pain Assessment: 0-10  0-10 (Numeric) Pain Score: 0 - No pain      Objective       Therapeutic Swallow:  Therapeutic Swallow Intervention : PO Trials, Caregiver Education  Solid Diet Recommendations: Soft & bite sized/chopped (IDDSI Level 6)  Liquid Diet Recommendations: Thin (IDDSI Level 0)    Inpatient:  SLP has provided pt and caregiver Kyra /TIFFANIE Yap DO    with the results and recommendations of this session.      Consultations/Referrals/Coordination of Services: n/a

## 2024-11-27 NOTE — NURSING NOTE
Met with patient, wife, and daughter at the bedside. Discharge Planning discussed. AVS updated with follow-ups, education, and medication information. Verified/ entered a PCP and pharmacy. New medications and side effects discussed. Discussed follow ups and meds. All questions answered.  Updated nurse on this info. AVS printed and hi-lighted. IV and tele removed previously.

## 2024-11-27 NOTE — DISCHARGE INSTRUCTIONS
Attention: You are being discharged on 2 new medications known as aspirin and Plavix.  These are antiplatelet medications for stroke.

## 2024-11-27 NOTE — PROGRESS NOTES
"Henry Alexandre Jr. is a 79 y.o. male on day 2 of admission presenting with Altered mental status, unspecified altered mental status type.      Subjective   Doing well.  Wife is at bedside defied history.  He had a good night sleep and no complaints and he is eating well.  No more confusional episodes.  His potassium was low today.       Objective     Last Recorded Vitals  Blood pressure 136/84, pulse 66, temperature 35.3 °C (95.5 °F), resp. rate 17, height 1.753 m (5' 9\"), weight 79.4 kg (175 lb), SpO2 93%.    Appearance:  no acute distress, cooperative.  HEENT: normocephalic /atraumatic.  Cardiovascular/Lungs/Abdomen: No carotid bruits to auscultation bilaterally, heart is regular in rate and rhythm.  Extremities/Skin: no peripheral edema.    NEUROLOGICAL EXAMINATION:    Mental status:  alert and oriented to person, place, date and situation.     Cranial nerves:    II/III: Visual fields are full. Pupils are 2 mm and reactive bilaterally.  III/IV/VI: Extraocular movements are full with no nystagmus.   V: Facial sensation is intact to light touch.  VII: Face is symmetric.  VIII: hearing is intact bilaterally.  IX/X: Palate elevates symmetrically to phonation.  XI: Sternocleidomastoid is MRC 5/5 to strength testing.  XII: Tongue is midline.    Motor exam: Strength is MRC 5/5 throughout. tone is normal.    Sensory exam: Sensation is intact to light touch throughout.    Reflexes: Reflexes are 2+ and symmetric. Bilateral plantar responses are flexor.    Coordination: intact    Gait exam: no ataxia.     Relevant Results  Scheduled medications  amLODIPine, 10 mg, oral, Nightly  aspirin, 81 mg, oral, q AM  atorvastatin, 80 mg, oral, Nightly  cetirizine, 10 mg, oral, q AM  clopidogrel, 75 mg, oral, Daily  enoxaparin, 40 mg, subcutaneous, q24h  levothyroxine, 75 mcg, oral, Daily before breakfast  lisinopril, 20 mg, oral, Daily  metoprolol succinate XL, 25 mg, oral, Daily with evening meal  pantoprazole, 40 mg, oral, BID " AC  perflutren lipid microspheres, 0.5-10 mL of dilution, intravenous, Once in imaging  perflutren protein A microsphere, 0.5 mL, intravenous, Once in imaging  sulfur hexafluoride microsphr, 2 mL, intravenous, Once in imaging  tamsulosin, 0.4 mg, oral, Nightly  triamterene-hydrochlorothiazid, 1 tablet, oral, Daily      Continuous medications     PRN medications  PRN medications: acetaminophen **OR** acetaminophen **OR** acetaminophen, hydrALAZINE **FOLLOWED BY** hydrALAZINE, labetaloL, OLANZapine, polyethylene glycol  Results for orders placed or performed during the hospital encounter of 11/25/24 (from the past 24 hours)   Transthoracic Echo (TTE) Complete   Result Value Ref Range    BSA 1.97 m2   POCT GLUCOSE   Result Value Ref Range    POCT Glucose 105 (H) 74 - 99 mg/dL   POCT GLUCOSE   Result Value Ref Range    POCT Glucose 139 (H) 74 - 99 mg/dL   POCT GLUCOSE   Result Value Ref Range    POCT Glucose 101 (H) 74 - 99 mg/dL   POCT GLUCOSE   Result Value Ref Range    POCT Glucose 94 74 - 99 mg/dL   POCT GLUCOSE   Result Value Ref Range    POCT Glucose 98 74 - 99 mg/dL   POCT GLUCOSE   Result Value Ref Range    POCT Glucose 140 (H) 74 - 99 mg/dL   Basic metabolic panel   Result Value Ref Range    Glucose 163 (H) 74 - 99 mg/dL    Sodium 138 136 - 145 mmol/L    Potassium 3.2 (L) 3.5 - 5.3 mmol/L    Chloride 101 98 - 107 mmol/L    Bicarbonate 30 21 - 32 mmol/L    Anion Gap 10 10 - 20 mmol/L    Urea Nitrogen 14 6 - 23 mg/dL    Creatinine 0.77 0.50 - 1.30 mg/dL    eGFR 90 >60 mL/min/1.73m*2    Calcium 8.9 8.6 - 10.3 mg/dL      ECG 12 lead    Result Date: 11/26/2024  Sinus rhythm Prolonged MD interval Right bundle branch block Lateral infarct, acute Probable anteroseptal infarct, recent >>> Acute MI <<<    Electrocardiogram, 12-lead PRN ACS symptoms    Result Date: 11/26/2024  Sinus or ectopic atrial rhythm Borderline prolonged MD interval Nonspecific intraventricular conduction delay Anterior infarct, acute (LAD) >>>  Acute MI <<<    MR angio neck wo IV contrast    Result Date: 11/25/2024  Interpreted By:  Jan Rock, STUDY: MR BRAIN WO IV CONTRAST; MR ANGIO HEAD WO IV CONTRAST; MR ANGIO NECK WO IV CONTRAST;  11/25/2024 7:39 pm   INDICATION: Signs/Symptoms:Stroke rule out. Stroke protocol.     COMPARISON: CTA head/neck 01/08/2023   ACCESSION NUMBER(S): [id number]; [id number]; [id number]   ORDERING CLINICIAN: ELYSE KLERMAN   TECHNIQUE: Multiplanar, multi-sequence images of the brain were obtained without contrast.   3D time-of-flight MR angiography of the head and neck was performed. The images were reviewed as source images and maximum intensity projections.   FINDINGS: MRI BRAIN:   Diffusion weighted images show a single subcentimeter focus of acute ischemic infarct within the subcortical white matter of the left temporal lobe.   Moderate periventricular and subcortical hemispheric white matter hypodensities are most compatible with chronic small vessel ischemic disease.Chronic lacunar infarct in the left caudal thalamic groove noted.   The major intracranial flow voids are preserved.   There is no acute intraparenchymal hemorrhage, mass, mass-effect, or an extra-axial fluid collection. There is no pathologic susceptibility artifact on GRE images.   The ventricular size and cerebral volume are age-concordant.   Normal morphology of midline structures. The craniovertebral junction is normal.   No acute abnormality of the orbits.   Retention cyst in the left maxillary sinus. Moderate mucosal thickening in the ethmoid and right frontal sinus.   The mastoid air cells are clear.     MRA HEAD: Severely narrowed left intracranial vertebral artery similar to 01/08/2023. Otherwise, no hemodynamically significant intracranial stenosis or large vessel occlusion. There is no intracranial aneurysm.     MRA NECK: Source images are motion degraded. The origins of the carotid and vertebral arteries are not included. Mild  spin-dephasing artifact is noted at the carotid bulbs.   COMMON/INTERNAL CAROTID ARTERIES: There atherosclerosis of the left carotid bulb and proximal postbulbar left ICA resulting in up to 50% stenosis. There is non hemodynamically significant atherosclerotic plaque of the right carotid bulb. The visualized common carotid arteries are widely patent.   VERTEBRAL ARTERIES:  No occlusion, hemodynamically significant stenosis, or dissection.       MRI BRAIN: 1. Single subcentimeter acute ischemic infarct in the subcortical white matter of the left temporal lobe. 2. Moderate burden of supratentorial chronic small vessel ischemic disease.   MRA HEAD AND NECK: 1. No evidence of major vessel occlusion or significant stenosis on MRA head. 2. 50% stenosis of the proximal postbulbar left ICA. Otherwise, no hemodynamically significant stenosis in the neck. 3. Severely narrowed left intracranial vertebral artery, unchanged from prior study.   MACRO: None.   Signed by: Jan Rock 11/25/2024 9:27 PM Dictation workstation:   [id number]    MR brain wo IV contrast    Result Date: 11/25/2024  Interpreted By:  Jan Rock, STUDY: MR BRAIN WO IV CONTRAST; MR ANGIO HEAD WO IV CONTRAST; MR ANGIO NECK WO IV CONTRAST;  11/25/2024 7:39 pm   INDICATION: Signs/Symptoms:Stroke rule out. Stroke protocol.     COMPARISON: CTA head/neck 01/08/2023   ACCESSION NUMBER(S): [id number]; [id number]; [id number]   ORDERING CLINICIAN: ELYSE KLERMAN   TECHNIQUE: Multiplanar, multi-sequence images of the brain were obtained without contrast.   3D time-of-flight MR angiography of the head and neck was performed. The images were reviewed as source images and maximum intensity projections.   FINDINGS: MRI BRAIN:   Diffusion weighted images show a single subcentimeter focus of acute ischemic infarct within the subcortical white matter of the left temporal lobe.   Moderate periventricular and subcortical hemispheric white matter hypodensities  are most compatible with chronic small vessel ischemic disease.Chronic lacunar infarct in the left caudal thalamic groove noted.   The major intracranial flow voids are preserved.   There is no acute intraparenchymal hemorrhage, mass, mass-effect, or an extra-axial fluid collection. There is no pathologic susceptibility artifact on GRE images.   The ventricular size and cerebral volume are age-concordant.   Normal morphology of midline structures. The craniovertebral junction is normal.   No acute abnormality of the orbits.   Retention cyst in the left maxillary sinus. Moderate mucosal thickening in the ethmoid and right frontal sinus.   The mastoid air cells are clear.     MRA HEAD: Severely narrowed left intracranial vertebral artery similar to 01/08/2023. Otherwise, no hemodynamically significant intracranial stenosis or large vessel occlusion. There is no intracranial aneurysm.     MRA NECK: Source images are motion degraded. The origins of the carotid and vertebral arteries are not included. Mild spin-dephasing artifact is noted at the carotid bulbs.   COMMON/INTERNAL CAROTID ARTERIES: There atherosclerosis of the left carotid bulb and proximal postbulbar left ICA resulting in up to 50% stenosis. There is non hemodynamically significant atherosclerotic plaque of the right carotid bulb. The visualized common carotid arteries are widely patent.   VERTEBRAL ARTERIES:  No occlusion, hemodynamically significant stenosis, or dissection.       MRI BRAIN: 1. Single subcentimeter acute ischemic infarct in the subcortical white matter of the left temporal lobe. 2. Moderate burden of supratentorial chronic small vessel ischemic disease.   MRA HEAD AND NECK: 1. No evidence of major vessel occlusion or significant stenosis on MRA head. 2. 50% stenosis of the proximal postbulbar left ICA. Otherwise, no hemodynamically significant stenosis in the neck. 3. Severely narrowed left intracranial vertebral artery, unchanged from  prior study.   MACRO: None.   Signed by: Jan Rock 11/25/2024 9:27 PM Dictation workstation:   [id number]    MR angio head wo IV contrast    Result Date: 11/25/2024  Interpreted By:  Jan Rock, STUDY: MR BRAIN WO IV CONTRAST; MR ANGIO HEAD WO IV CONTRAST; MR ANGIO NECK WO IV CONTRAST;  11/25/2024 7:39 pm   INDICATION: Signs/Symptoms:Stroke rule out. Stroke protocol.     COMPARISON: CTA head/neck 01/08/2023   ACCESSION NUMBER(S): [id number]; [id number]; [id number]   ORDERING CLINICIAN: ELYSE KLERMAN   TECHNIQUE: Multiplanar, multi-sequence images of the brain were obtained without contrast.   3D time-of-flight MR angiography of the head and neck was performed. The images were reviewed as source images and maximum intensity projections.   FINDINGS: MRI BRAIN:   Diffusion weighted images show a single subcentimeter focus of acute ischemic infarct within the subcortical white matter of the left temporal lobe.   Moderate periventricular and subcortical hemispheric white matter hypodensities are most compatible with chronic small vessel ischemic disease.Chronic lacunar infarct in the left caudal thalamic groove noted.   The major intracranial flow voids are preserved.   There is no acute intraparenchymal hemorrhage, mass, mass-effect, or an extra-axial fluid collection. There is no pathologic susceptibility artifact on GRE images.   The ventricular size and cerebral volume are age-concordant.   Normal morphology of midline structures. The craniovertebral junction is normal.   No acute abnormality of the orbits.   Retention cyst in the left maxillary sinus. Moderate mucosal thickening in the ethmoid and right frontal sinus.   The mastoid air cells are clear.     MRA HEAD: Severely narrowed left intracranial vertebral artery similar to 01/08/2023. Otherwise, no hemodynamically significant intracranial stenosis or large vessel occlusion. There is no intracranial aneurysm.     MRA NECK: Source images  are motion degraded. The origins of the carotid and vertebral arteries are not included. Mild spin-dephasing artifact is noted at the carotid bulbs.   COMMON/INTERNAL CAROTID ARTERIES: There atherosclerosis of the left carotid bulb and proximal postbulbar left ICA resulting in up to 50% stenosis. There is non hemodynamically significant atherosclerotic plaque of the right carotid bulb. The visualized common carotid arteries are widely patent.   VERTEBRAL ARTERIES:  No occlusion, hemodynamically significant stenosis, or dissection.       MRI BRAIN: 1. Single subcentimeter acute ischemic infarct in the subcortical white matter of the left temporal lobe. 2. Moderate burden of supratentorial chronic small vessel ischemic disease.   MRA HEAD AND NECK: 1. No evidence of major vessel occlusion or significant stenosis on MRA head. 2. 50% stenosis of the proximal postbulbar left ICA. Otherwise, no hemodynamically significant stenosis in the neck. 3. Severely narrowed left intracranial vertebral artery, unchanged from prior study.   MACRO: None.   Signed by: Jan Rock 11/25/2024 9:27 PM Dictation workstation:   [id number]    CT abdomen pelvis w IV contrast    Result Date: 11/25/2024  Interpreted By:  Henry Scott, STUDY: CT ABDOMEN PELVIS W IV CONTRAST;  11/25/2024 12:00 pm   INDICATION: Signs/Symptoms:abd pain.   COMPARISON: None.   ACCESSION NUMBER(S): [id number]   ORDERING CLINICIAN: VIC SCHMIDT   TECHNIQUE: CT of the abdomen and pelvis was performed. Contiguous axial images were obtained at 3 mm slice thickness through the abdomen and pelvis. Coronal and sagittal reconstructions at 3 mm slice thickness were performed.  Omnipaque 350 was injected intravenously.   FINDINGS: LOWER CHEST: Included lung bases are clear. There is fairly extensive coronary artery atherosclerotic calcification primarily involving the LAD.   ABDOMEN:   LIVER: The hepatic parenchyma is homogeneous without evidence of focal liver  lesions.The hepatic size is normal.   SPLEEN: The spleen is normal in size and homogeneous.   ADRENAL GLANDS: Bilateral adrenal glands appear normal.   KIDNEYS AND URETERS: The renal cortices are unremarkable and the renal sizes within normal limits , with cortical cyst(s).   The ureteral courses are unremarkable without dilatation or radiodense calculi.   PANCREAS: The pancreas appears unremarkable, there is no ductal dilatation or masses.   GALLBLADDER: No radiodense calculi, wall thickening or pericholecystic fluid.   BILE DUCTS: There is no biliary dilatation or filling defects.     VESSELS: Moderate to extensive atherosclerotic calcifications are noted predominantly at the aorta and iliac system. There is also moderate atherosclerotic calcification of the superior mesenteric and renal arteries, and mild at the celiac artery.   PERITONEUM AND RETROPERITONEUM: No ascites or free air, no fluid collection.   The retroperitoneum appears unremarkable, and without significant adenopathy.   No enlarged mesenteric lymph nodes.   BOWEL: Fecal residue seen throughout the colon, non specific but suggests constipation.  The bowel otherwise is unremarkable without significant dilatation or mural thickening.   PELVIS:   BLADDER: The urinary bladder contour is smooth.   REPRODUCTIVE ORGANS: Prostatomegaly with gland measuring 6.2cm. The pelvic structures otherwise are unremarkable.     BONE, ABDOMINAL WALL AND OTHER FINDINGS: There is marked narrowing of the L5-S1 disc interspace with marked anterior marginal osteophyte formation, grade 1 spondylolisthesis and bilateral spondylolysis. Otherwise there is mild spondylosis at the thoracic spine, no suspicious osseous lesions.   The abdominal wall soft tissues appear normal.       1.  Atherosclerosis as described. 2. Possible constipation. Otherwise no acute findings.       XR chest 1 view    Result Date: 11/25/2024  Interpreted By:  Bryan Ruff, STUDY: XR CHEST 1 VIEW;  11/25/2024 11:18 am   INDICATION: CLINICAL INFORMATION: Signs/Symptoms:sepsis.   COMPARISON: 04/02/2024   ACCESSION NUMBER(S): [id number]   ORDERING CLINICIAN: ELYSE KLERMAN   TECHNIQUE: Portable chest one view.   FINDINGS: The cardiac size is indeterminate in view of the AP projection. Patchy bibasilar atelectasis and/or infiltrate is present, slightly more marked on the left. Sternal fixations devices and mediastinal clips are present. No effusions are appreciated within limits of this study.       Patchy bibasilar atelectasis and/or infiltrate slightly more marked on the left. Postsurgical findings as above.       CT brain attack head wo IV contrast    Result Date: 11/25/2024  Interpreted By:  Millie Wesley, STUDY: CT BRAIN ATTACK HEAD WO IV CONTRAST; ;  11/25/2024 9:22 am   INDICATION: Signs/Symptoms:Stroke Evaluation.   COMPARISON: 01/08/2023   ACCESSION NUMBER(S): [id number]   ORDERING CLINICIAN: ELYSE KLERMAN   TECHNIQUE: Serial axial images of the head were obtained without intravenous contrast. Sagittal and coronal reconstructions were generated.   FINDINGS: The ventricles are midline and slightly enlarged. There is prominence of the cortical sulci and superior cerebellar cisterns. There is spotty periventricular white matter disease.   There are bilateral small lacunar infarcts.   There are no acute parenchymal abnormalities. There is no hemorrhage or extra-axial fluid.   There is no obvious skull fracture.   There is paranasal sinus disease. The mastoid air cells are unremarkable. There is no obvious scalp hematoma. The patient appears to be status post bilateral cataract surgery.   COMPARISON OF FINDINGS: Brain is similar.       No acute intracranial abnormality. Mild generalized cerebral and cerebellar atrophy.   The findings were discussed with DARLEEN Borjas at 9:29 a.m..   MACRO: none   Signed by: Millie Wesley 11/25/2024 9:30 AM Dictation workstation:   [id number]    XR chest 2 views    Result  Date: 11/12/2024  * * *Final Report* * * DATE OF EXAM: Nov 12 2024 12:30PM   JIX   [id number]  -  XR CHEST 2V FRONTAL/LAT  / ACCESSION #  [id number] PROCEDURE REASON: multiple diagnoses      * * * * Physician Interpretation * * * *  EXAMINATION:  CHEST RADIOGRAPH (2 VIEW FRONTAL & LATERAL) CLINICAL HISTORY:  Hiatal hernia Pre-procedure lab exam MQ:  [id number] EXAM DATE/TIME:  11/12/2024 12:30 PM COMPARISON:  10/27/2024 RESULT: Lines, tubes, and devices:  None. Lungs and pleura:  No new focal lung consolidation is seen.  There is minimal bibasilar atelectasis.  No substantial pleural effusion is seen. There is no pneumothorax. Cardiomediastinal silhouette:  Stable cardiomediastinal silhouette.  The aortic arch is atherosclerotic. Status post median sternotomy and coronary artery bypass grafting.  A hiatal hernia is suspected. Bones and soft tissues:  Degenerative changes are seen in the thoracic spine.    Potassium is low.  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  Clinically has improved.  Echo result showed aortic valve sclerosis.  Continue with aspirin and Plavix long-term.  He will also benefit from neurology follow-up as outpatient for the next 2 months.  Secondary Dx: Hyperlipidemia, hypertension, hypokalemia.  Monitor potassium  BP goal now normotensive, out of 24 hour window.  Continue statin , at goal < 70.  2 week heart monitor on discharge.   At risk of delirium. Avoid sedative if possible.    Continue DVT prophylaxis.  Okay for discharge from a neurologic perspective.    Please call neurology on-call for any questions or concerns.

## 2024-11-28 LAB
ATRIAL RATE: 80 BPM
ATRIAL RATE: 91 BPM
P AXIS: 247 DEGREES
P AXIS: 33 DEGREES
PR INTERVAL: 224 MS
PR INTERVAL: 294 MS
Q ONSET: 251 MS
Q ONSET: 251 MS
QRS COUNT: 13 BEATS
QRS COUNT: 14 BEATS
QRS DURATION: 158 MS
QRS DURATION: 164 MS
QT INTERVAL: 403 MS
QT INTERVAL: 439 MS
QTC CALCULATION(BAZETT): 465 MS
QTC CALCULATION(BAZETT): 535 MS
QTC FREDERICIA: 443 MS
QTC FREDERICIA: 500 MS
R AXIS: 122 DEGREES
R AXIS: 239 DEGREES
T AXIS: -49 DEGREES
T AXIS: 103 DEGREES
T OFFSET: 452 MS
T OFFSET: 470 MS
VENTRICULAR RATE: 80 BPM
VENTRICULAR RATE: 89 BPM

## 2024-11-29 LAB
BACTERIA BLD CULT: NORMAL
BACTERIA BLD CULT: NORMAL

## 2024-12-04 NOTE — DOCUMENTATION CLARIFICATION NOTE
"    PATIENT:               WILIAN PENG  ACCT #:                  [id number]  MRN:                       [id number]  :                       1945  ADMIT DATE:       2024 9:12 AM  DISCH DATE:        2024 4:00 PM  RESPONDING PROVIDER #:        [id number]          PROVIDER RESPONSE TEXT:    Sepsis was a differential diagnosis and ruled out after study    CDI QUERY TEXT:    Clarification        Instruction:  Based on your assessment of the patient and the clinical information, please provide the requested documentation by clicking on the appropriate radio button and enter any additional information if prompted.    Question: Sepsis was documented in the medical record. Based on the documentation and the clinical information, can the diagnosis be further clarified as    When answering this query, please exercise your independent professional judgment. The fact that a question is being asked, does not imply that any particular answer is desired or expected.    The patient's clinical indicators include:  Clinical Information:  79 y.o. male with a history of hiatal hernia s/p repair in October, GERD, CVA , vascular dementia, CAD status post CABG , hypertension, hyperlipidemia, hypothyroidism, BPH, dysphagia who was brought to [facility] for worsening confusion and aphasia.      Clinical Indicators:  -Vital Signs: 24:  T 37.3  P  89  R  18  BP  167/81  SpO2 93 percent    -WBC:12.9  on 24    -Microbiology Results: Blood cx no growth on 24    -Band Neutrophil Count/percent Band Neutrophil: n/a    -Lactic acid:1.2 on 24    -BUN/Creat:20/0.82 on 24    -Bilirubin: 0.7 on24    -MAP: 90 on 24    -Vinicio Coma Scale: 14 on 24    -PAO2/FIO2: n/a    -Procalcitonin: 0.03 on 24    -Platelets:294 on 24      -Other clinical indicators:  24  ED note:  \"Altered mental status, unspecified altered mental status type  Sepsis, due to unspecified " "organism, unspecified whether acute organ dysfunction present\"    Treatment:  11/25/24:  IV Zosyn  11/25/24:  IV Vancomycin  11/25/24: IV Azithromycin    Risk Factors:  Elderly, Villagomez catheter in place, dysphagia  Options provided:  -- Sepsis was a differential diagnosis and ruled out after study  -- Sepsis with neurological organ dysfunction of altered mental status  -- Other - I will add my own diagnosis  -- Refer to Clinical Documentation Reviewer    Query created by: Raiza Posey on 12/4/2024 9:49 AM      Electronically signed by:  MACK MCMILLAN DO 12/4/2024 10:13 AM          "

## 2025-01-16 LAB — BODY SURFACE AREA: 1.97 M2

## 2025-01-16 PROCEDURE — 93248 EXT ECG>7D<15D REV&INTERPJ: CPT | Performed by: INTERNAL MEDICINE

## 2025-08-17 ENCOUNTER — HOSPITAL ENCOUNTER (OUTPATIENT)
Facility: HOSPITAL | Age: 80
Setting detail: OBSERVATION
Discharge: HOME | End: 2025-08-18
Attending: STUDENT IN AN ORGANIZED HEALTH CARE EDUCATION/TRAINING PROGRAM | Admitting: STUDENT IN AN ORGANIZED HEALTH CARE EDUCATION/TRAINING PROGRAM
Payer: MEDICARE
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
## 2025-08-17 DIAGNOSIS — T18.128A ESOPHAGEAL OBSTRUCTION DUE TO FOOD IMPACTION: Primary | ICD-10-CM

## 2025-08-17 PROCEDURE — 99223 1ST HOSP IP/OBS HIGH 75: CPT | Performed by: STUDENT IN AN ORGANIZED HEALTH CARE EDUCATION/TRAINING PROGRAM

## 2025-08-17 PROCEDURE — 2500000004 HC RX 250 GENERAL PHARMACY W/ HCPCS (ALT 636 FOR OP/ED): Mod: JZ | Performed by: PHYSICIAN ASSISTANT

## 2025-08-17 PROCEDURE — 99285 EMERGENCY DEPT VISIT HI MDM: CPT | Performed by: STUDENT IN AN ORGANIZED HEALTH CARE EDUCATION/TRAINING PROGRAM

## 2025-08-17 PROCEDURE — 96372 THER/PROPH/DIAG INJ SC/IM: CPT | Performed by: PHYSICIAN ASSISTANT

## 2025-08-17 RX ADMIN — GLUCAGON 1 MG: KIT at 22:19

## 2025-08-17 ASSESSMENT — PAIN - FUNCTIONAL ASSESSMENT: PAIN_FUNCTIONAL_ASSESSMENT: 0-10

## 2025-08-17 ASSESSMENT — PAIN DESCRIPTION - PAIN TYPE: TYPE: ACUTE PAIN

## 2025-08-17 ASSESSMENT — PAIN DESCRIPTION - LOCATION: LOCATION: CHEST

## 2025-08-17 ASSESSMENT — PAIN SCALES - GENERAL: PAINLEVEL_OUTOF10: 5 - MODERATE PAIN

## 2025-08-18 ENCOUNTER — ANESTHESIA (OUTPATIENT)
Dept: GASTROENTEROLOGY | Facility: HOSPITAL | Age: 80
End: 2025-08-18
Payer: MEDICARE

## 2025-08-18 ENCOUNTER — ANESTHESIA EVENT (OUTPATIENT)
Dept: GASTROENTEROLOGY | Facility: HOSPITAL | Age: 80
End: 2025-08-18
Payer: MEDICARE

## 2025-08-18 ENCOUNTER — APPOINTMENT (OUTPATIENT)
Dept: GASTROENTEROLOGY | Facility: HOSPITAL | Age: 80
End: 2025-08-18
Payer: MEDICARE

## 2025-08-18 VITALS
RESPIRATION RATE: 10 BRPM | TEMPERATURE: 97 F | SYSTOLIC BLOOD PRESSURE: 165 MMHG | HEART RATE: 47 BPM | OXYGEN SATURATION: 93 % | BODY MASS INDEX: 25.34 KG/M2 | WEIGHT: 177 LBS | DIASTOLIC BLOOD PRESSURE: 81 MMHG | HEIGHT: 70 IN
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
## 2025-08-18 PROBLEM — T18.128A FOOD IMPACTION OF ESOPHAGUS, INITIAL ENCOUNTER: Status: ACTIVE | Noted: 2025-08-18

## 2025-08-18 LAB
ALBUMIN SERPL BCP-MCNC: 4.6 G/DL (ref 3.4–5)
ALP SERPL-CCNC: 73 U/L (ref 33–136)
ALT SERPL W P-5'-P-CCNC: 18 U/L (ref 10–52)
ANION GAP SERPL CALC-SCNC: 11 MMOL/L (ref 10–20)
AST SERPL W P-5'-P-CCNC: 32 U/L (ref 9–39)
BASOPHILS # BLD AUTO: 0.06 X10*3/UL (ref 0–0.1)
BASOPHILS NFR BLD AUTO: 0.4 %
BILIRUB SERPL-MCNC: 0.6 MG/DL (ref 0–1.2)
BUN SERPL-MCNC: 18 MG/DL (ref 6–23)
CALCIUM SERPL-MCNC: 9.2 MG/DL (ref 8.6–10.3)
CHLORIDE SERPL-SCNC: 101 MMOL/L (ref 98–107)
CO2 SERPL-SCNC: 29 MMOL/L (ref 21–32)
CREAT SERPL-MCNC: 0.92 MG/DL (ref 0.5–1.3)
EGFRCR SERPLBLD CKD-EPI 2021: 83 ML/MIN/1.73M*2
EOSINOPHIL # BLD AUTO: 0.19 X10*3/UL (ref 0–0.4)
EOSINOPHIL NFR BLD AUTO: 1.4 %
ERYTHROCYTE [DISTWIDTH] IN BLOOD BY AUTOMATED COUNT: 13.1 % (ref 11.5–14.5)
GLUCOSE SERPL-MCNC: 167 MG/DL (ref 74–99)
HCT VFR BLD AUTO: 46.7 % (ref 41–52)
HGB BLD-MCNC: 15.6 G/DL (ref 13.5–17.5)
IMM GRANULOCYTES # BLD AUTO: 0.07 X10*3/UL (ref 0–0.5)
IMM GRANULOCYTES NFR BLD AUTO: 0.5 % (ref 0–0.9)
LYMPHOCYTES # BLD AUTO: 2.28 X10*3/UL (ref 0.8–3)
LYMPHOCYTES NFR BLD AUTO: 16.7 %
MCH RBC QN AUTO: 32.2 PG (ref 26–34)
MCHC RBC AUTO-ENTMCNC: 33.4 G/DL (ref 32–36)
MCV RBC AUTO: 97 FL (ref 80–100)
MONOCYTES # BLD AUTO: 0.68 X10*3/UL (ref 0.05–0.8)
MONOCYTES NFR BLD AUTO: 5 %
NEUTROPHILS # BLD AUTO: 10.34 X10*3/UL (ref 1.6–5.5)
NEUTROPHILS NFR BLD AUTO: 76 %
NRBC BLD-RTO: 0 /100 WBCS (ref 0–0)
PLATELET # BLD AUTO: 251 X10*3/UL (ref 150–450)
POTASSIUM SERPL-SCNC: 4.8 MMOL/L (ref 3.5–5.3)
PROT SERPL-MCNC: 7.5 G/DL (ref 6.4–8.2)
RBC # BLD AUTO: 4.84 X10*6/UL (ref 4.5–5.9)
SODIUM SERPL-SCNC: 136 MMOL/L (ref 136–145)
WBC # BLD AUTO: 13.6 X10*3/UL (ref 4.4–11.3)

## 2025-08-18 PROCEDURE — 43249 ESOPH EGD DILATION <30 MM: CPT | Performed by: INTERNAL MEDICINE

## 2025-08-18 PROCEDURE — 2500000004 HC RX 250 GENERAL PHARMACY W/ HCPCS (ALT 636 FOR OP/ED): Mod: JZ | Performed by: PHYSICIAN ASSISTANT

## 2025-08-18 PROCEDURE — A43249 PR EDG BALLOON DILATION ESOPHAGUS <30 MM DIAM: Performed by: ANESTHESIOLOGY

## 2025-08-18 PROCEDURE — 99100 ANES PT EXTEME AGE<1 YR&>70: CPT | Performed by: ANESTHESIOLOGY

## 2025-08-18 PROCEDURE — 2500000004 HC RX 250 GENERAL PHARMACY W/ HCPCS (ALT 636 FOR OP/ED): Performed by: NURSE ANESTHETIST, CERTIFIED REGISTERED

## 2025-08-18 PROCEDURE — 3700000002 HC GENERAL ANESTHESIA TIME - EACH INCREMENTAL 1 MINUTE

## 2025-08-18 PROCEDURE — G0378 HOSPITAL OBSERVATION PER HR: HCPCS

## 2025-08-18 PROCEDURE — C1726 CATH, BAL DIL, NON-VASCULAR: HCPCS

## 2025-08-18 PROCEDURE — 96372 THER/PROPH/DIAG INJ SC/IM: CPT | Performed by: PHYSICIAN ASSISTANT

## 2025-08-18 PROCEDURE — 36415 COLL VENOUS BLD VENIPUNCTURE: CPT | Performed by: PHYSICIAN ASSISTANT

## 2025-08-18 PROCEDURE — 2720000007 HC OR 272 NO HCPCS

## 2025-08-18 PROCEDURE — 7100000009 HC PHASE TWO TIME - INITIAL BASE CHARGE

## 2025-08-18 PROCEDURE — 3700000001 HC GENERAL ANESTHESIA TIME - INITIAL BASE CHARGE

## 2025-08-18 PROCEDURE — 85025 COMPLETE CBC W/AUTO DIFF WBC: CPT | Performed by: PHYSICIAN ASSISTANT

## 2025-08-18 PROCEDURE — 7100000010 HC PHASE TWO TIME - EACH INCREMENTAL 1 MINUTE

## 2025-08-18 PROCEDURE — 43247 EGD REMOVE FOREIGN BODY: CPT

## 2025-08-18 PROCEDURE — 80053 COMPREHEN METABOLIC PANEL: CPT | Performed by: PHYSICIAN ASSISTANT

## 2025-08-18 PROCEDURE — A43249 PR EDG BALLOON DILATION ESOPHAGUS <30 MM DIAM: Performed by: NURSE ANESTHETIST, CERTIFIED REGISTERED

## 2025-08-18 RX ORDER — PROPOFOL 10 MG/ML
INJECTION, EMULSION INTRAVENOUS AS NEEDED
Status: DISCONTINUED | OUTPATIENT
Start: 2025-08-18 | End: 2025-08-18

## 2025-08-18 RX ORDER — MORPHINE SULFATE 4 MG/ML
2 INJECTION, SOLUTION INTRAMUSCULAR; INTRAVENOUS ONCE
Status: DISCONTINUED | OUTPATIENT
Start: 2025-08-18 | End: 2025-08-18 | Stop reason: HOSPADM

## 2025-08-18 RX ORDER — LIDOCAINE HCL/PF 100 MG/5ML
SYRINGE (ML) INTRAVENOUS AS NEEDED
Status: DISCONTINUED | OUTPATIENT
Start: 2025-08-18 | End: 2025-08-18

## 2025-08-18 RX ORDER — ONDANSETRON HYDROCHLORIDE 2 MG/ML
4 INJECTION, SOLUTION INTRAVENOUS EVERY 6 HOURS PRN
Status: DISCONTINUED | OUTPATIENT
Start: 2025-08-18 | End: 2025-08-18 | Stop reason: HOSPADM

## 2025-08-18 RX ORDER — ENOXAPARIN SODIUM 100 MG/ML
40 INJECTION SUBCUTANEOUS EVERY 24 HOURS
Status: DISCONTINUED | OUTPATIENT
Start: 2025-08-18 | End: 2025-08-18 | Stop reason: HOSPADM

## 2025-08-18 RX ADMIN — GLUCAGON 1 MG: KIT at 00:02

## 2025-08-18 RX ADMIN — PROPOFOL 100 MCG/KG/MIN: 10 INJECTION, EMULSION INTRAVENOUS at 07:59

## 2025-08-18 RX ADMIN — PROPOFOL 50 MG: 10 INJECTION, EMULSION INTRAVENOUS at 07:58

## 2025-08-18 RX ADMIN — LIDOCAINE HYDROCHLORIDE 60 MG: 20 INJECTION, SOLUTION INTRAVENOUS at 07:58

## 2025-08-18 RX ADMIN — SODIUM CHLORIDE, SODIUM LACTATE, POTASSIUM CHLORIDE, AND CALCIUM CHLORIDE: .6; .31; .03; .02 INJECTION, SOLUTION INTRAVENOUS at 07:55

## 2025-08-18 SDOH — HEALTH STABILITY: MENTAL HEALTH: CURRENT SMOKER: 0

## 2025-08-18 ASSESSMENT — PAIN - FUNCTIONAL ASSESSMENT
PAIN_FUNCTIONAL_ASSESSMENT: UNABLE TO SELF-REPORT
PAIN_FUNCTIONAL_ASSESSMENT: 0-10
PAIN_FUNCTIONAL_ASSESSMENT: UNABLE TO SELF-REPORT

## 2025-08-18 ASSESSMENT — PAIN SCALES - GENERAL
PAINLEVEL_OUTOF10: 0 - NO PAIN
PAINLEVEL_OUTOF10: 0 - NO PAIN
PAIN_LEVEL: 0
PAINLEVEL_OUTOF10: 0 - NO PAIN